# Patient Record
Sex: MALE | Race: BLACK OR AFRICAN AMERICAN | NOT HISPANIC OR LATINO | Employment: UNEMPLOYED | URBAN - METROPOLITAN AREA
[De-identification: names, ages, dates, MRNs, and addresses within clinical notes are randomized per-mention and may not be internally consistent; named-entity substitution may affect disease eponyms.]

---

## 2022-07-03 ENCOUNTER — HOSPITAL ENCOUNTER (INPATIENT)
Facility: HOSPITAL | Age: 47
LOS: 6 days | Discharge: HOME | DRG: 684 | End: 2022-07-11
Attending: EMERGENCY MEDICINE | Admitting: INTERNAL MEDICINE
Payer: COMMERCIAL

## 2022-07-03 DIAGNOSIS — N17.9 AKI (ACUTE KIDNEY INJURY): ICD-10-CM

## 2022-07-03 DIAGNOSIS — R17 SERUM TOTAL BILIRUBIN ELEVATED: ICD-10-CM

## 2022-07-03 DIAGNOSIS — R07.9 CHEST PAIN: ICD-10-CM

## 2022-07-03 DIAGNOSIS — E80.6 HYPERBILIRUBINEMIA: ICD-10-CM

## 2022-07-03 DIAGNOSIS — I10 HYPERTENSION, UNSPECIFIED TYPE: ICD-10-CM

## 2022-07-03 DIAGNOSIS — R79.89 ELEVATED LFTS: ICD-10-CM

## 2022-07-03 DIAGNOSIS — R55 SYNCOPE: ICD-10-CM

## 2022-07-03 DIAGNOSIS — E87.6 HYPOKALEMIA: ICD-10-CM

## 2022-07-03 DIAGNOSIS — N17.9 ACUTE KIDNEY INJURY: Primary | ICD-10-CM

## 2022-07-03 DIAGNOSIS — G40.909 SEIZURE DISORDER: ICD-10-CM

## 2022-07-03 DIAGNOSIS — R74.8 ELEVATED LIVER ENZYMES: ICD-10-CM

## 2022-07-03 DIAGNOSIS — R55 NEAR SYNCOPE: ICD-10-CM

## 2022-07-03 PROBLEM — K76.6 PORTAL HYPERTENSION: Status: ACTIVE | Noted: 2022-07-03

## 2022-07-03 LAB
ALBUMIN SERPL BCP-MCNC: 3.2 G/DL (ref 3.5–5.2)
ALP SERPL-CCNC: 190 U/L (ref 55–135)
ALT SERPL W/O P-5'-P-CCNC: 508 U/L (ref 10–44)
AMPHET+METHAMPHET UR QL: NEGATIVE
ANION GAP SERPL CALC-SCNC: 12 MMOL/L (ref 8–16)
AST SERPL-CCNC: 333 U/L (ref 10–40)
BARBITURATES UR QL SCN>200 NG/ML: NEGATIVE
BASOPHILS # BLD AUTO: 0.09 K/UL (ref 0–0.2)
BASOPHILS NFR BLD: 1 % (ref 0–1.9)
BENZODIAZ UR QL SCN>200 NG/ML: NEGATIVE
BILIRUB DIRECT SERPL-MCNC: 5.1 MG/DL (ref 0.1–0.3)
BILIRUB SERPL-MCNC: 6.9 MG/DL (ref 0.1–1)
BILIRUB UR QL STRIP: ABNORMAL
BNP SERPL-MCNC: 36 PG/ML (ref 0–99)
BUN SERPL-MCNC: 16 MG/DL (ref 6–20)
BZE UR QL SCN: NEGATIVE
CALCIUM SERPL-MCNC: 9.2 MG/DL (ref 8.7–10.5)
CANNABINOIDS UR QL SCN: NEGATIVE
CHLORIDE SERPL-SCNC: 105 MMOL/L (ref 95–110)
CK SERPL-CCNC: 324 U/L (ref 20–200)
CLARITY UR: CLEAR
CO2 SERPL-SCNC: 25 MMOL/L (ref 23–29)
COLOR UR: YELLOW
CREAT SERPL-MCNC: 2.1 MG/DL (ref 0.5–1.4)
CREAT UR-MCNC: 130.9 MG/DL (ref 23–375)
CREAT UR-MCNC: 149.2 MG/DL (ref 23–375)
DIFFERENTIAL METHOD: ABNORMAL
EOSINOPHIL # BLD AUTO: 0.2 K/UL (ref 0–0.5)
EOSINOPHIL NFR BLD: 1.9 % (ref 0–8)
ERYTHROCYTE [DISTWIDTH] IN BLOOD BY AUTOMATED COUNT: 21.7 % (ref 11.5–14.5)
EST. GFR  (AFRICAN AMERICAN): 43 ML/MIN/1.73 M^2
EST. GFR  (NON AFRICAN AMERICAN): 37 ML/MIN/1.73 M^2
GLUCOSE SERPL-MCNC: 104 MG/DL (ref 70–110)
GLUCOSE UR QL STRIP: NEGATIVE
HCT VFR BLD AUTO: 37.1 % (ref 40–54)
HGB BLD-MCNC: 13 G/DL (ref 14–18)
HGB UR QL STRIP: NEGATIVE
IMM GRANULOCYTES # BLD AUTO: 0.02 K/UL (ref 0–0.04)
IMM GRANULOCYTES NFR BLD AUTO: 0.2 % (ref 0–0.5)
KETONES UR QL STRIP: NEGATIVE
LEUKOCYTE ESTERASE UR QL STRIP: NEGATIVE
LYMPHOCYTES # BLD AUTO: 2.3 K/UL (ref 1–4.8)
LYMPHOCYTES NFR BLD: 26 % (ref 18–48)
MCH RBC QN AUTO: 28.6 PG (ref 27–31)
MCHC RBC AUTO-ENTMCNC: 35 G/DL (ref 32–36)
MCV RBC AUTO: 82 FL (ref 82–98)
METHADONE UR QL SCN>300 NG/ML: NEGATIVE
MONOCYTES # BLD AUTO: 1.3 K/UL (ref 0.3–1)
MONOCYTES NFR BLD: 15.2 % (ref 4–15)
NEUTROPHILS # BLD AUTO: 4.9 K/UL (ref 1.8–7.7)
NEUTROPHILS NFR BLD: 55.7 % (ref 38–73)
NITRITE UR QL STRIP: NEGATIVE
NRBC BLD-RTO: 0 /100 WBC
OPIATES UR QL SCN: NEGATIVE
PCP UR QL SCN>25 NG/ML: NEGATIVE
PH UR STRIP: 7 [PH] (ref 5–8)
PLATELET # BLD AUTO: 241 K/UL (ref 150–450)
PMV BLD AUTO: 10.6 FL (ref 9.2–12.9)
POTASSIUM SERPL-SCNC: 3 MMOL/L (ref 3.5–5.1)
PROT SERPL-MCNC: 7.8 G/DL (ref 6–8.4)
PROT UR QL STRIP: ABNORMAL
RBC # BLD AUTO: 4.55 M/UL (ref 4.6–6.2)
SARS-COV-2 RDRP RESP QL NAA+PROBE: NEGATIVE
SODIUM SERPL-SCNC: 142 MMOL/L (ref 136–145)
SODIUM UR-SCNC: 118 MMOL/L (ref 20–250)
SP GR UR STRIP: 1.01 (ref 1–1.03)
TOXICOLOGY INFORMATION: NORMAL
TROPONIN I SERPL DL<=0.01 NG/ML-MCNC: 0.03 NG/ML (ref 0–0.03)
TSH SERPL DL<=0.005 MIU/L-ACNC: 0.75 UIU/ML (ref 0.4–4)
URN SPEC COLLECT METH UR: ABNORMAL
UROBILINOGEN UR STRIP-ACNC: >=8 EU/DL
UUN UR-MCNC: 441 MG/DL (ref 140–1050)
WBC # BLD AUTO: 8.84 K/UL (ref 3.9–12.7)

## 2022-07-03 PROCEDURE — 82550 ASSAY OF CK (CPK): CPT | Performed by: STUDENT IN AN ORGANIZED HEALTH CARE EDUCATION/TRAINING PROGRAM

## 2022-07-03 PROCEDURE — G0378 HOSPITAL OBSERVATION PER HR: HCPCS

## 2022-07-03 PROCEDURE — 84540 ASSAY OF URINE/UREA-N: CPT | Performed by: STUDENT IN AN ORGANIZED HEALTH CARE EDUCATION/TRAINING PROGRAM

## 2022-07-03 PROCEDURE — 87389 HIV-1 AG W/HIV-1&-2 AB AG IA: CPT | Performed by: STUDENT IN AN ORGANIZED HEALTH CARE EDUCATION/TRAINING PROGRAM

## 2022-07-03 PROCEDURE — 81003 URINALYSIS AUTO W/O SCOPE: CPT | Mod: 59 | Performed by: EMERGENCY MEDICINE

## 2022-07-03 PROCEDURE — 93010 EKG 12-LEAD: ICD-10-PCS | Mod: ,,, | Performed by: INTERNAL MEDICINE

## 2022-07-03 PROCEDURE — 96361 HYDRATE IV INFUSION ADD-ON: CPT

## 2022-07-03 PROCEDURE — 84484 ASSAY OF TROPONIN QUANT: CPT | Performed by: EMERGENCY MEDICINE

## 2022-07-03 PROCEDURE — 93010 ELECTROCARDIOGRAM REPORT: CPT | Mod: ,,, | Performed by: INTERNAL MEDICINE

## 2022-07-03 PROCEDURE — 96360 HYDRATION IV INFUSION INIT: CPT

## 2022-07-03 PROCEDURE — 96372 THER/PROPH/DIAG INJ SC/IM: CPT | Performed by: STUDENT IN AN ORGANIZED HEALTH CARE EDUCATION/TRAINING PROGRAM

## 2022-07-03 PROCEDURE — 85025 COMPLETE CBC W/AUTO DIFF WBC: CPT | Performed by: EMERGENCY MEDICINE

## 2022-07-03 PROCEDURE — 80074 ACUTE HEPATITIS PANEL: CPT | Performed by: STUDENT IN AN ORGANIZED HEALTH CARE EDUCATION/TRAINING PROGRAM

## 2022-07-03 PROCEDURE — 83880 ASSAY OF NATRIURETIC PEPTIDE: CPT | Performed by: EMERGENCY MEDICINE

## 2022-07-03 PROCEDURE — 84300 ASSAY OF URINE SODIUM: CPT | Performed by: STUDENT IN AN ORGANIZED HEALTH CARE EDUCATION/TRAINING PROGRAM

## 2022-07-03 PROCEDURE — 63600175 PHARM REV CODE 636 W HCPCS: Performed by: STUDENT IN AN ORGANIZED HEALTH CARE EDUCATION/TRAINING PROGRAM

## 2022-07-03 PROCEDURE — 93005 ELECTROCARDIOGRAM TRACING: CPT

## 2022-07-03 PROCEDURE — 80053 COMPREHEN METABOLIC PANEL: CPT | Performed by: EMERGENCY MEDICINE

## 2022-07-03 PROCEDURE — 25000003 PHARM REV CODE 250: Performed by: STUDENT IN AN ORGANIZED HEALTH CARE EDUCATION/TRAINING PROGRAM

## 2022-07-03 PROCEDURE — 25000003 PHARM REV CODE 250: Performed by: EMERGENCY MEDICINE

## 2022-07-03 PROCEDURE — U0002 COVID-19 LAB TEST NON-CDC: HCPCS | Performed by: EMERGENCY MEDICINE

## 2022-07-03 PROCEDURE — 84443 ASSAY THYROID STIM HORMONE: CPT | Performed by: STUDENT IN AN ORGANIZED HEALTH CARE EDUCATION/TRAINING PROGRAM

## 2022-07-03 PROCEDURE — 80307 DRUG TEST PRSMV CHEM ANLYZR: CPT | Performed by: EMERGENCY MEDICINE

## 2022-07-03 PROCEDURE — 82248 BILIRUBIN DIRECT: CPT | Performed by: STUDENT IN AN ORGANIZED HEALTH CARE EDUCATION/TRAINING PROGRAM

## 2022-07-03 PROCEDURE — 99285 EMERGENCY DEPT VISIT HI MDM: CPT | Mod: 25

## 2022-07-03 PROCEDURE — 82570 ASSAY OF URINE CREATININE: CPT | Performed by: STUDENT IN AN ORGANIZED HEALTH CARE EDUCATION/TRAINING PROGRAM

## 2022-07-03 RX ORDER — SODIUM CHLORIDE 0.9 % (FLUSH) 0.9 %
10 SYRINGE (ML) INJECTION EVERY 12 HOURS PRN
Status: DISCONTINUED | OUTPATIENT
Start: 2022-07-03 | End: 2022-07-11 | Stop reason: HOSPADM

## 2022-07-03 RX ORDER — NALOXONE HCL 0.4 MG/ML
0.02 VIAL (ML) INJECTION
Status: DISCONTINUED | OUTPATIENT
Start: 2022-07-03 | End: 2022-07-11 | Stop reason: HOSPADM

## 2022-07-03 RX ORDER — HYDRALAZINE HYDROCHLORIDE 100 MG/1
100 TABLET, FILM COATED ORAL 3 TIMES DAILY
Status: ON HOLD | COMMUNITY
Start: 2022-03-18 | End: 2022-07-11 | Stop reason: HOSPADM

## 2022-07-03 RX ORDER — SILDENAFIL CITRATE 20 MG/1
TABLET ORAL
COMMUNITY
Start: 2022-04-20 | End: 2022-07-03

## 2022-07-03 RX ORDER — ATORVASTATIN CALCIUM 40 MG/1
40 TABLET, FILM COATED ORAL DAILY
Status: DISCONTINUED | OUTPATIENT
Start: 2022-07-04 | End: 2022-07-04

## 2022-07-03 RX ORDER — CHLORTHALIDONE 25 MG/1
25 TABLET ORAL EVERY MORNING
Status: ON HOLD | COMMUNITY
Start: 2022-03-18 | End: 2022-07-11 | Stop reason: HOSPADM

## 2022-07-03 RX ORDER — ATORVASTATIN CALCIUM 40 MG/1
40 TABLET, FILM COATED ORAL DAILY
COMMUNITY
Start: 2022-03-18

## 2022-07-03 RX ORDER — LAMOTRIGINE 25 MG/1
50 TABLET ORAL 2 TIMES DAILY
Status: ON HOLD | COMMUNITY
Start: 2022-04-16 | End: 2022-07-11 | Stop reason: HOSPADM

## 2022-07-03 RX ORDER — AMLODIPINE BESYLATE 5 MG/1
10 TABLET ORAL DAILY
Status: DISCONTINUED | OUTPATIENT
Start: 2022-07-04 | End: 2022-07-11 | Stop reason: HOSPADM

## 2022-07-03 RX ORDER — AMLODIPINE BESYLATE 10 MG/1
10 TABLET ORAL DAILY
COMMUNITY
Start: 2022-03-08

## 2022-07-03 RX ORDER — SODIUM CHLORIDE 9 MG/ML
1000 INJECTION, SOLUTION INTRAVENOUS
Status: COMPLETED | OUTPATIENT
Start: 2022-07-03 | End: 2022-07-03

## 2022-07-03 RX ORDER — LAMOTRIGINE 25 MG/1
50 TABLET ORAL 2 TIMES DAILY
Status: DISCONTINUED | OUTPATIENT
Start: 2022-07-03 | End: 2022-07-06

## 2022-07-03 RX ORDER — POTASSIUM CHLORIDE 750 MG/1
10 TABLET, EXTENDED RELEASE ORAL
Status: COMPLETED | OUTPATIENT
Start: 2022-07-03 | End: 2022-07-04

## 2022-07-03 RX ORDER — CARVEDILOL 12.5 MG/1
12.5 TABLET ORAL 2 TIMES DAILY
Status: ON HOLD | COMMUNITY
End: 2022-07-11 | Stop reason: HOSPADM

## 2022-07-03 RX ORDER — HEPARIN SODIUM 5000 [USP'U]/ML
5000 INJECTION, SOLUTION INTRAVENOUS; SUBCUTANEOUS EVERY 8 HOURS
Status: DISCONTINUED | OUTPATIENT
Start: 2022-07-03 | End: 2022-07-11 | Stop reason: HOSPADM

## 2022-07-03 RX ADMIN — HEPARIN SODIUM 5000 UNITS: 5000 INJECTION INTRAVENOUS; SUBCUTANEOUS at 10:07

## 2022-07-03 RX ADMIN — POTASSIUM CHLORIDE 10 MEQ: 10 TABLET, EXTENDED RELEASE ORAL at 10:07

## 2022-07-03 RX ADMIN — SODIUM CHLORIDE 1000 ML: 0.9 INJECTION, SOLUTION INTRAVENOUS at 05:07

## 2022-07-03 RX ADMIN — POTASSIUM CHLORIDE 10 MEQ: 10 TABLET, EXTENDED RELEASE ORAL at 09:07

## 2022-07-03 RX ADMIN — SODIUM CHLORIDE, SODIUM LACTATE, POTASSIUM CHLORIDE, AND CALCIUM CHLORIDE 2000 ML: .6; .31; .03; .02 INJECTION, SOLUTION INTRAVENOUS at 10:07

## 2022-07-03 RX ADMIN — POTASSIUM CHLORIDE 10 MEQ: 10 TABLET, EXTENDED RELEASE ORAL at 11:07

## 2022-07-03 NOTE — ED PROVIDER NOTES
Encounter Date: 7/3/2022       History     Chief Complaint   Patient presents with    near syncopal episode     Patient was at work and admits that he may have taken too much of his BP medication. Patient became dizzy and near fainted. BP was 90's palpable and improved after fluid bolus. EMS started iv access and gave fluid bolus of 150. Denies pain at present time. Patient works at [employer]     Patient is a 47 yo AAM with a pmhx of HTN who presents to the ED via EMS with the complaint of near syncope. [place] airport [place]. Pt states that he is prescribed chlorthalidone 25 mg daily, amlodipine 10mg daily, carvedilol 12.5mg BID, hydralazine 100mg (0.5 tablets) TID. Pt states that he took 2 of his hydralazine today. He denies any preceding chest pain or shortness of breath. Pt reports feeling initially light headed and dizzy, but he denies any complaints at this time. Per EMS, the patient's BP was 90/palp and improved with a small NS bolus. Additionally, the pt does have a seizure hx for which he takes Lamictal but he denies any seizure type activity when questioned (he denies any oral trauma and/or urinary incontinence).         Review of patient's allergies indicates:  No Known Allergies  No past medical history on file.  No past surgical history on file.  No family history on file.     Review of Systems   Constitutional: Negative for chills and fever.   HENT: Negative for congestion, trouble swallowing and voice change.    Respiratory: Negative for cough, chest tightness and shortness of breath.    Gastrointestinal: Negative for abdominal pain, diarrhea, nausea and vomiting.   Genitourinary: Negative for dysuria and frequency.   Musculoskeletal: Negative for back pain, joint swelling and myalgias.   Skin: Negative for pallor and rash.   Neurological: Positive for syncope (near syncope) and light-headedness. Negative for dizziness, seizures, speech difficulty and  headaches.   Psychiatric/Behavioral: Negative for agitation and confusion.       Physical Exam     Initial Vitals [07/03/22 1443]   BP Pulse Resp Temp SpO2   137/87 62 20 98.2 °F (36.8 °C) 100 %      MAP       --         Physical Exam    Nursing note and vitals reviewed.  Constitutional: He appears well-developed and well-nourished.   Well-appearing  No acute distress noted    HENT:   Head: Normocephalic and atraumatic.   Right Ear: External ear normal.   Left Ear: External ear normal.   Oropharynx clear - no signs of trauma    Eyes: Conjunctivae and EOM are normal. Pupils are equal, round, and reactive to light.   Neck: Neck supple.   Normal range of motion.  Cardiovascular: Normal rate, regular rhythm, normal heart sounds and intact distal pulses.   Pulmonary/Chest: Breath sounds normal.   Abdominal: Abdomen is soft. Bowel sounds are normal.   Musculoskeletal:         General: Normal range of motion.      Cervical back: Normal range of motion and neck supple.     Neurological: He is alert and oriented to person, place, and time. He has normal strength. GCS score is 15. GCS eye subscore is 4. GCS verbal subscore is 5. GCS motor subscore is 6.   Skin: Skin is warm. Capillary refill takes less than 2 seconds.   Psychiatric: He has a normal mood and affect.         ED Course   Procedures  Labs Reviewed   CBC W/ AUTO DIFFERENTIAL - Abnormal; Notable for the following components:       Result Value    RBC 4.55 (*)     Hemoglobin 13.0 (*)     Hematocrit 37.1 (*)     RDW 21.7 (*)     Mono # 1.3 (*)     Mono % 15.2 (*)     All other components within normal limits   COMPREHENSIVE METABOLIC PANEL - Abnormal; Notable for the following components:    Potassium 3.0 (*)     Creatinine 2.1 (*)     Albumin 3.2 (*)     Total Bilirubin 6.9 (*)     Alkaline Phosphatase 190 (*)      (*)      (*)     eGFR if  43 (*)     eGFR if non  37 (*)     All other components within normal limits    TROPONIN I - Abnormal; Notable for the following components:    Troponin I 0.028 (*)     All other components within normal limits   URINALYSIS, REFLEX TO URINE CULTURE - Abnormal; Notable for the following components:    Protein, UA Trace (*)     Bilirubin (UA) 1+ (*)     Urobilinogen, UA >=8.0 (*)     All other components within normal limits    Narrative:     Specimen Source->Urine   B-TYPE NATRIURETIC PEPTIDE   DRUG SCREEN PANEL, URINE EMERGENCY    Narrative:     Specimen Source->Urine   SARS-COV-2 RNA AMPLIFICATION, QUAL   CK     EKG Readings: (Independently Interpreted)   NSR; non specific T wave abnormality; no STEMI        Imaging Results          X-Ray Chest AP Portable (Final result)  Result time 07/03/22 16:23:03    Final result by Gentry Grant MD (07/03/22 16:23:03)                 Impression:      No radiographic acute intrathoracic process seen on this single view.      Electronically signed by: Gentry Grant MD  Date:    07/03/2022  Time:    16:23             Narrative:    EXAMINATION:  XR CHEST AP PORTABLE    CLINICAL HISTORY:  Syncope and collapse    TECHNIQUE:  Single frontal view of the chest was performed.    COMPARISON:  None    FINDINGS:  Monitoring leads overlie the chest.The lungs are well expanded and clear, with normal appearance of pulmonary vasculature and no pleural effusion or pneumothorax.    The cardiac silhouette is normal in size. The hilar and mediastinal contours are unremarkable.    No acute osseous process seen.  PA and lateral views can be obtained.                                 Medications   0.9%  NaCl infusion (1,000 mLs Intravenous New Bag 7/3/22 1700)     Medical Decision Making:   Clinical Tests:   Lab Tests: Ordered and Reviewed  ED Management:  - pt with Cr of 2.1 following small fluid bolus; no baseline to which to compare; pt with elevated LFTs, Tbili - he denies any hx of liver dz, RUQ pain  - will admit for JOÃO, elevated LFTs  - pt in agreement with plan for  admission                         Clinical Impression:   Final diagnoses:  [R55] Syncope  [R55] Near syncope  [N17.9] Acute kidney injury (Primary)  [R79.89] Elevated LFTs  [R17] Serum total bilirubin elevated          ED Disposition Condition    Observation               Dominic Branham MD  07/03/22 5882

## 2022-07-03 NOTE — PHARMACY MED REC
"    [facility]           Pharmacy  Admission Medication History     The home medication history was taken by Mary Anne Ribeiro.      Medication history obtained from Medications listed below were obtained from: Patient/family    Based on information gathered for medication list, you may go to "Admission" then "Reconcile Home Medications" tabs to review and/or act upon those items.      The home medication list has been updated by the Pharmacy department.    Please read ALL comments highlighted in yellow.    Please address this information as you see fit.     Feel free to contact us if you have any questions or require assistance.      No current facility-administered medications on file prior to encounter.     Current Outpatient Medications on File Prior to Encounter   Medication Sig Dispense Refill    amLODIPine (NORVASC) 10 MG tablet Take 10 mg by mouth once daily.      atorvastatin (LIPITOR) 40 MG tablet Take 40 mg by mouth once daily.      carvediloL (COREG) 12.5 MG tablet Take 12.5 mg by mouth 2 (two) times daily.      chlorthalidone (HYGROTEN) 25 MG Tab Take 25 mg by mouth every morning.      hydrALAZINE (APRESOLINE) 100 MG tablet Take 100 mg by mouth 3 (three) times daily.      lamoTRIgine (LAMICTAL) 25 MG tablet Take 50 mg by mouth 2 (two) times daily.      sildenafil (REVATIO) 20 mg Tab SMARTSIG:3 Tablet(s) By Mouth PRN         Please address this information as you see fit.  Feel free to contact us if you have any questions or require assistance.    Mary Anne Ribeiro  [phone]              .          "

## 2022-07-04 LAB
ALBUMIN SERPL BCP-MCNC: 2.8 G/DL (ref 3.5–5.2)
ALP SERPL-CCNC: 183 U/L (ref 55–135)
ALT SERPL W/O P-5'-P-CCNC: 411 U/L (ref 10–44)
ANION GAP SERPL CALC-SCNC: 12 MMOL/L (ref 8–16)
APAP SERPL-MCNC: <3 UG/ML (ref 10–20)
AST SERPL-CCNC: 261 U/L (ref 10–40)
BASOPHILS # BLD AUTO: 0.09 K/UL (ref 0–0.2)
BASOPHILS NFR BLD: 1 % (ref 0–1.9)
BILIRUB SERPL-MCNC: 7 MG/DL (ref 0.1–1)
BUN SERPL-MCNC: 15 MG/DL (ref 6–20)
CALCIUM SERPL-MCNC: 9 MG/DL (ref 8.7–10.5)
CHLORIDE SERPL-SCNC: 106 MMOL/L (ref 95–110)
CHOLEST SERPL-MCNC: 213 MG/DL (ref 120–199)
CHOLEST/HDLC SERPL: 17.8 {RATIO} (ref 2–5)
CO2 SERPL-SCNC: 26 MMOL/L (ref 23–29)
CREAT SERPL-MCNC: 1.7 MG/DL (ref 0.5–1.4)
CRP SERPL-MCNC: 12.8 MG/L (ref 0–8.2)
DIFFERENTIAL METHOD: ABNORMAL
EOSINOPHIL # BLD AUTO: 0.2 K/UL (ref 0–0.5)
EOSINOPHIL NFR BLD: 2.5 % (ref 0–8)
ERYTHROCYTE [DISTWIDTH] IN BLOOD BY AUTOMATED COUNT: 21.6 % (ref 11.5–14.5)
ERYTHROCYTE [SEDIMENTATION RATE] IN BLOOD BY WESTERGREN METHOD: 13 MM/HR (ref 0–10)
EST. GFR  (AFRICAN AMERICAN): 55 ML/MIN/1.73 M^2
EST. GFR  (NON AFRICAN AMERICAN): 48 ML/MIN/1.73 M^2
GLUCOSE SERPL-MCNC: 104 MG/DL (ref 70–110)
HAV IGM SERPL QL IA: NEGATIVE
HBV CORE IGM SERPL QL IA: NEGATIVE
HBV SURFACE AG SERPL QL IA: NEGATIVE
HCT VFR BLD AUTO: 35.6 % (ref 40–54)
HCV AB SERPL QL IA: NEGATIVE
HDLC SERPL-MCNC: 12 MG/DL (ref 40–75)
HDLC SERPL: 5.6 % (ref 20–50)
HGB BLD-MCNC: 12.4 G/DL (ref 14–18)
HIV 1+2 AB+HIV1 P24 AG SERPL QL IA: NEGATIVE
IMM GRANULOCYTES # BLD AUTO: 0.02 K/UL (ref 0–0.04)
IMM GRANULOCYTES NFR BLD AUTO: 0.2 % (ref 0–0.5)
LDLC SERPL CALC-MCNC: 175 MG/DL (ref 63–159)
LYMPHOCYTES # BLD AUTO: 2.9 K/UL (ref 1–4.8)
LYMPHOCYTES NFR BLD: 32 % (ref 18–48)
MAGNESIUM SERPL-MCNC: 1.7 MG/DL (ref 1.6–2.6)
MCH RBC QN AUTO: 28.9 PG (ref 27–31)
MCHC RBC AUTO-ENTMCNC: 34.8 G/DL (ref 32–36)
MCV RBC AUTO: 83 FL (ref 82–98)
MONOCYTES # BLD AUTO: 1.2 K/UL (ref 0.3–1)
MONOCYTES NFR BLD: 13.4 % (ref 4–15)
NEUTROPHILS # BLD AUTO: 4.7 K/UL (ref 1.8–7.7)
NEUTROPHILS NFR BLD: 50.9 % (ref 38–73)
NONHDLC SERPL-MCNC: 201 MG/DL
NRBC BLD-RTO: 0 /100 WBC
PHOSPHATE SERPL-MCNC: 3.4 MG/DL (ref 2.7–4.5)
PLATELET # BLD AUTO: 235 K/UL (ref 150–450)
PMV BLD AUTO: 10.9 FL (ref 9.2–12.9)
POTASSIUM SERPL-SCNC: 3 MMOL/L (ref 3.5–5.1)
PROT SERPL-MCNC: 7 G/DL (ref 6–8.4)
RBC # BLD AUTO: 4.29 M/UL (ref 4.6–6.2)
SALICYLATES SERPL-MCNC: <5 MG/DL (ref 15–30)
SODIUM SERPL-SCNC: 144 MMOL/L (ref 136–145)
TRIGL SERPL-MCNC: 130 MG/DL (ref 30–150)
TROPONIN I SERPL DL<=0.01 NG/ML-MCNC: 0.01 NG/ML (ref 0–0.03)
TROPONIN I SERPL DL<=0.01 NG/ML-MCNC: 0.02 NG/ML (ref 0–0.03)
WBC # BLD AUTO: 9.13 K/UL (ref 3.9–12.7)

## 2022-07-04 PROCEDURE — 63600175 PHARM REV CODE 636 W HCPCS: Performed by: STUDENT IN AN ORGANIZED HEALTH CARE EDUCATION/TRAINING PROGRAM

## 2022-07-04 PROCEDURE — 99204 OFFICE O/P NEW MOD 45 MIN: CPT | Mod: ,,, | Performed by: INTERNAL MEDICINE

## 2022-07-04 PROCEDURE — 85025 COMPLETE CBC W/AUTO DIFF WBC: CPT | Performed by: STUDENT IN AN ORGANIZED HEALTH CARE EDUCATION/TRAINING PROGRAM

## 2022-07-04 PROCEDURE — 99204 PR OFFICE/OUTPT VISIT, NEW, LEVL IV, 45-59 MIN: ICD-10-PCS | Mod: ,,, | Performed by: INTERNAL MEDICINE

## 2022-07-04 PROCEDURE — G0378 HOSPITAL OBSERVATION PER HR: HCPCS

## 2022-07-04 PROCEDURE — 86140 C-REACTIVE PROTEIN: CPT | Performed by: STUDENT IN AN ORGANIZED HEALTH CARE EDUCATION/TRAINING PROGRAM

## 2022-07-04 PROCEDURE — 96372 THER/PROPH/DIAG INJ SC/IM: CPT | Performed by: STUDENT IN AN ORGANIZED HEALTH CARE EDUCATION/TRAINING PROGRAM

## 2022-07-04 PROCEDURE — 80179 DRUG ASSAY SALICYLATE: CPT | Performed by: STUDENT IN AN ORGANIZED HEALTH CARE EDUCATION/TRAINING PROGRAM

## 2022-07-04 PROCEDURE — 80053 COMPREHEN METABOLIC PANEL: CPT | Performed by: STUDENT IN AN ORGANIZED HEALTH CARE EDUCATION/TRAINING PROGRAM

## 2022-07-04 PROCEDURE — 25000003 PHARM REV CODE 250: Performed by: STUDENT IN AN ORGANIZED HEALTH CARE EDUCATION/TRAINING PROGRAM

## 2022-07-04 PROCEDURE — 36415 COLL VENOUS BLD VENIPUNCTURE: CPT | Performed by: STUDENT IN AN ORGANIZED HEALTH CARE EDUCATION/TRAINING PROGRAM

## 2022-07-04 PROCEDURE — 83735 ASSAY OF MAGNESIUM: CPT | Performed by: STUDENT IN AN ORGANIZED HEALTH CARE EDUCATION/TRAINING PROGRAM

## 2022-07-04 PROCEDURE — 80061 LIPID PANEL: CPT | Performed by: STUDENT IN AN ORGANIZED HEALTH CARE EDUCATION/TRAINING PROGRAM

## 2022-07-04 PROCEDURE — 85652 RBC SED RATE AUTOMATED: CPT | Performed by: STUDENT IN AN ORGANIZED HEALTH CARE EDUCATION/TRAINING PROGRAM

## 2022-07-04 PROCEDURE — 84100 ASSAY OF PHOSPHORUS: CPT | Performed by: STUDENT IN AN ORGANIZED HEALTH CARE EDUCATION/TRAINING PROGRAM

## 2022-07-04 PROCEDURE — 80143 DRUG ASSAY ACETAMINOPHEN: CPT | Performed by: STUDENT IN AN ORGANIZED HEALTH CARE EDUCATION/TRAINING PROGRAM

## 2022-07-04 PROCEDURE — 84484 ASSAY OF TROPONIN QUANT: CPT | Performed by: STUDENT IN AN ORGANIZED HEALTH CARE EDUCATION/TRAINING PROGRAM

## 2022-07-04 RX ORDER — MAGNESIUM SULFATE HEPTAHYDRATE 40 MG/ML
2 INJECTION, SOLUTION INTRAVENOUS ONCE
Status: COMPLETED | OUTPATIENT
Start: 2022-07-04 | End: 2022-07-04

## 2022-07-04 RX ADMIN — POTASSIUM BICARBONATE 10 MEQ: 391 TABLET, EFFERVESCENT ORAL at 12:07

## 2022-07-04 RX ADMIN — POTASSIUM BICARBONATE 10 MEQ: 391 TABLET, EFFERVESCENT ORAL at 10:07

## 2022-07-04 RX ADMIN — HEPARIN SODIUM 5000 UNITS: 5000 INJECTION INTRAVENOUS; SUBCUTANEOUS at 08:07

## 2022-07-04 RX ADMIN — AMLODIPINE BESYLATE 10 MG: 5 TABLET ORAL at 08:07

## 2022-07-04 RX ADMIN — LAMOTRIGINE 50 MG: 25 TABLET ORAL at 08:07

## 2022-07-04 RX ADMIN — LAMOTRIGINE 50 MG: 25 TABLET ORAL at 09:07

## 2022-07-04 RX ADMIN — POTASSIUM BICARBONATE 10 MEQ: 391 TABLET, EFFERVESCENT ORAL at 01:07

## 2022-07-04 RX ADMIN — MAGNESIUM SULFATE HEPTAHYDRATE 2 G: 40 INJECTION, SOLUTION INTRAVENOUS at 12:07

## 2022-07-04 RX ADMIN — SODIUM CHLORIDE, SODIUM LACTATE, POTASSIUM CHLORIDE, AND CALCIUM CHLORIDE 1000 ML: .6; .31; .03; .02 INJECTION, SOLUTION INTRAVENOUS at 10:07

## 2022-07-04 RX ADMIN — HEPARIN SODIUM 5000 UNITS: 5000 INJECTION INTRAVENOUS; SUBCUTANEOUS at 09:07

## 2022-07-04 RX ADMIN — HEPARIN SODIUM 5000 UNITS: 5000 INJECTION INTRAVENOUS; SUBCUTANEOUS at 01:07

## 2022-07-04 RX ADMIN — POTASSIUM CHLORIDE 10 MEQ: 10 TABLET, EXTENDED RELEASE ORAL at 12:07

## 2022-07-04 NOTE — PROGRESS NOTES
[facility] Resident Progress Note    Admitting Team: [facility] Hospitalist Team A   Attending Physician: Greg Pack MD   Resident:  Ebony  Intern: Eugenio    Date of Admit: 7/3/2022    Subjective:      NAEON    This AM, patient is resting comfortably. No complaints at this time. Denies any dizziness, sweating, pain, f/c/n/v.      Objective:   Last 24 Hour Vital Signs:  BP  Min: 122/59  Max: 156/90  Temp  Av.2 °F (36.8 °C)  Min: 98.2 °F (36.8 °C)  Max: 98.2 °F (36.8 °C)  Pulse  Av.7  Min: 62  Max: 74  Resp  Av  Min: 11  Max: 20  SpO2  Av.2 %  Min: 94 %  Max: 100 %  Height  Av' (182.9 cm)  Min: 6' (182.9 cm)  Max: 6' (182.9 cm)  Weight  Av kg (279 lb 15.8 oz)  Min: 127 kg (279 lb 15.8 oz)  Max: 127 kg (279 lb 15.8 oz)  No intake/output data recorded.    Physical Examination:    Physical Exam     General: A&Ox3, resting comfortably in bed  HEENT: PERRL, EOMI, moist mucus membranes w/ no erythema noted, no facial asymmetry noted  Neck: Trachea midline, no masses, no lymphadenopathy, no JVD  Cardiovascular: Regular rate and rhythm, normal S1 and S2, no murmurs, rubs or gallops  Pulm: CTAB, no wheezes or crackles; no respiratory distress  Abdomen: Soft, non-tender, normal BS, non-distended; no organomegaly  Skin: No rashes or erythema noted, no ecchymosis  Extremities: atraumatic, no cyanosis or edema  Pulses: 2+ and symmetric  Neurological: A&Ox3, no focal deficits  Psychiatric: normal mood and affect, thought content normal    Laboratory:  Trended Lab Data:  Recent Labs   Lab 22  1625 22  0502   WBC 8.84 9.13   HGB 13.0* 12.4*   HCT 37.1* 35.6*    235   MCV 82 83   RDW 21.7* 21.6*    144   K 3.0* 3.0*    106   CO2 25 26   BUN 16 15   CREATININE 2.1* 1.7*    104   PROT 7.8 7.0   ALBUMIN 3.2* 2.8*   BILITOT 6.9* 7.0*   * 261*   ALKPHOS 190* 183*   * 411*       Trended Cardiac Data:  Recent Labs   Lab 22  1625   TROPONINI 0.028*   BNP 36        Microbiology:  Microbiology Results (last 7 days)     ** No results found for the last 168 hours. **           Other Results:  EKG (my interpretation):     No results found for this or any previous visit.     Radiology:  Imaging Results          US Abdomen Complete (Final result)  Result time 07/03/22 22:20:00    Final result by Kit Johnson DO (07/03/22 22:20:00)                 Impression:      Nonspecific gallbladder wall thickening which may be reactive or may be accentuated by gallbladder contraction.  No cholelithiasis or sonographic evidence of acute cholecystitis.    Hepatomegaly.      Electronically signed by: Kit Johnson  Date:    07/03/2022  Time:    22:20             Narrative:    EXAMINATION:  US ABDOMEN COMPLETE    CLINICAL HISTORY:  hyperbilirubinemia;    TECHNIQUE:  Complete abdominal ultrasound (including pancreas, aorta, liver, gallbladder, common bile duct, IVC, kidneys, and spleen) was performed.    COMPARISON:  None    FINDINGS:  Pancreas: The visualized portions of pancreas appear normal.    Aorta: No aneurysm.    Liver: 17.8 cm, enlarged.  Homogeneous parenchymal echotexture. No focal lesions.    Gallbladder: There is gallbladder wall thickening, measuring up to 6 mm.  The gallbladder is contracted.  There is gallbladder wall hyperemia.  No calculi or pericholecystic fluid.  Negative sonographic Calderón's sign.    Biliary system: 6 mm common bile duct.  No intrahepatic ductal dilatation.    Inferior vena cava: Normal in appearance.    Right kidney: 9.9 cm. No hydronephrosis.    Left kidney: 10.0 cm. No hydronephrosis.    Spleen: 10.3 x 3.6 cm.  Normal in size with homogeneous echotexture.    Miscellaneous: No ascites.                               X-Ray Chest AP Portable (Final result)  Result time 07/03/22 16:23:03    Final result by Gentry Grant MD (07/03/22 16:23:03)                 Impression:      No radiographic acute intrathoracic process seen on this single  view.      Electronically signed by: Gentry Grant MD  Date:    07/03/2022  Time:    16:23             Narrative:    EXAMINATION:  XR CHEST AP PORTABLE    CLINICAL HISTORY:  Syncope and collapse    TECHNIQUE:  Single frontal view of the chest was performed.    COMPARISON:  None    FINDINGS:  Monitoring leads overlie the chest.The lungs are well expanded and clear, with normal appearance of pulmonary vasculature and no pleural effusion or pneumothorax.    The cardiac silhouette is normal in size. The hilar and mediastinal contours are unremarkable.    No acute osseous process seen.  PA and lateral views can be obtained.                                 Current Medications:     Infusions:      Scheduled:   amLODIPine  10 mg Oral Daily    heparin (porcine)  5,000 Units Subcutaneous Q8H    lamoTRIgine  50 mg Oral BID        PRN:  naloxone, sodium chloride 0.9%    Antibiotics (From admission, onward)            None             Assessment:     Niecy Bolivar is a 46 y.o.male with  Patient Active Problem List    Diagnosis Date Noted    JOÃO (acute kidney injury) 07/03/2022    Elevated liver enzymes 07/03/2022    Hypokalemia 07/03/2022    Seizure disorder 07/03/2022    Elevated troponin 07/03/2022    Hyperbilirubinemia 07/03/2022    Hypertension 07/03/2022      Pt is a 46yoM who presents to [facility] for dizziness w/ near syncope for one day. Found to have been taking double his hydralazine dose for one month. Also found to have JOÃO with Cr of 2.1, transaminitis with  and , and hyperbilirubinemia with Tbilli 6.9. No history of renal disease or hepatic disease. No history of EtOH use. IV fluids given by ED with transfer to [facility] for further work up and management of JOÃO, hyperbilirubinemia, elevated liver function labs. Renal function improved this AM with Cr 1.7. Direct bilirubin found to be elevated at 5.1. Hepatitis Panels still in progress.       Plan:        JOÃO   -Cr on admit 2.1, baseline  unknown  -likely 2/2 to low volume status due to accidental over use of home antihypertensives compounded by sweating outside in heat  -Cr improved to 1.7 overnight with IV fluids  -Continue to replete  -Avoid nephrotoxic drugs, contrast        Near Syncope/ Dizziness  -Pt hypotensive per EMS to 90's upon their arrival  -Improved with fluid bolus  -No complaints of dizziness/fainting at this time  -Likely 2/2 to taking double hydralazine dose and compounded by hot weather  -CTM      Hyperbilirubinemia  -Tbilli 6.9 on admit  -Direct Bilirubin elevated at 5.1  -Abdominal US with gall bladder wall thickening, enlarged liver  -Daily CMP     Elevated Liver Enzymes  -, , Alk phos 190 on admit  -Hepatitis Panel in progress  -Improved to AST 26, , Alk phos 183     Hypokalemia  -K 3 on admit  -Oral K tablet 10meq administered   -No change in K overnight, remains at 3 this morning  -Replete with oral K 40 me     Essential HTN  -patient on chlorthalidone 25 mg daily, amlodipine 10mg daily, carvedilol 12.5mg BID, hydralazine 100mg (0.5 tablets) TID  - Patient reports doubling hydralazine dose for last month  - Amlodipine resumed and will titrate PRN  -Pressure elevated overnight and this AM at 140-156/69-80  -Resume chlorthalidone     Seizure Disorder  -Seizure free for years  -Continue home Lamictal 50mg    Diet: Reg  Code: Full  PPx: Hep  Dispo: Hyperbilirubinemia work up and continued resolution of JOÃO    Gigi Becker MD  [facility] Neurology HO-1  [facility] Internal Medicine Service   [facility] Hospitalist Team     [facility] Medicine Hospitalist Pager numbers:   [facility] Hospitalist Medicine Team A (Sirena/Luara): [phone]  [facility] Hospitalist Medicine Team B (Rober/Preston):

## 2022-07-04 NOTE — PLAN OF CARE
VN cued into room to complete admit assessment. VIP model introduced; VN working alongside bedside treatment team.  Plan of care reviewed with patient. Patient informed of fall risk, fall precautions, call light within reach, side rails x2 elevated. Patient notified to ask staff for assistance. Patient verbalized complete understanding. Time allowed for questions. Will continue to monitor and intervene as needed.

## 2022-07-04 NOTE — H&P
[facility] Hospital Medicine H&P Note     Admitting Team: [facility] Hospitalist Team A  Attending Physician: Sirena   Resident:  Ebony  Intern: Eugenio      Date of Admit: 7/3/2022    Chief Complaint     Dizziness and near syncope for one day    Subjective:      History of Present Illness:  Niecy Bolivar is a 46 y.o. male with a pmhx of HTN. [place] [facility] [place] with a primary complaint of dizziness with a near syncopal episode. Patient states he was in his normal state of health at work (patient works outside in heat at the  [employer] and became dizzy, sweaty, and near fainted. EMS reports the patients BP was 90's (palpable) but improved after fluid bolus. Patient denies any chest pain, palpitations, shortness of breath, cough, f/c/n/v.    Of note, patient states that he takes four different medications for his high blood pressure (chlorthalidone 25 mg daily, amlodipine 10mg daily, carvedilol 12.5mg BID, hydralazine 100mg (0.5 tablets) TID). He states that due to work hours, he has been taking two of his hydralazine tablets when he awakens at once instead of one so that he doesn't have to miss a dose at work. He states he has been doing this for about one month since he has been working in [place] (patient lives in [place].)     In the ED, the patient was found to have labs significant for elevated Cr (2.1 with unknown baseline), elevated transaminases (, ), and elevated Tbilli (6.9). He denies any history of renal disease, diabetes, or liver disease. He also denies heavy alcohol consumption or illicit drug use. He was given IV fluids in the ED and [facility] was consulted for management of new JOÃO, elevated transaminases, and consolidation of hypertensive medications.       Past Medical History:  No past medical history on file.    Past Surgical History:  No past surgical history on file.    Allergies:  Review of patient's allergies indicates:  No Known Allergies    Home  Medications:  Prior to Admission medications    Medication Sig Start Date End Date Taking? Authorizing Provider   amLODIPine (NORVASC) 10 MG tablet Take 10 mg by mouth once daily. 3/8/22  Yes Historical Provider   atorvastatin (LIPITOR) 40 MG tablet Take 40 mg by mouth once daily. 3/18/22  Yes Historical Provider   carvediloL (COREG) 12.5 MG tablet Take 12.5 mg by mouth 2 (two) times daily.   Yes Historical Provider   chlorthalidone (HYGROTEN) 25 MG Tab Take 25 mg by mouth every morning. 3/18/22  Yes Historical Provider   hydrALAZINE (APRESOLINE) 100 MG tablet Take 100 mg by mouth 3 (three) times daily. 3/18/22  Yes Historical Provider   lamoTRIgine (LAMICTAL) 25 MG tablet Take 50 mg by mouth 2 (two) times daily. 22  Yes Historical Provider   sildenafil (REVATIO) 20 mg Tab SMARTSIG:3 Tablet(s) By Mouth PRN 4/20/22 7/3/22  Historical Provider       Family History:  No family history on file.    Social History:       Review of Systems:    Review of Systems   Constitutional: Positive for diaphoresis.   HENT: Negative.    Eyes: Negative.    Respiratory: Negative.    Cardiovascular: Negative.    Gastrointestinal: Negative.    Genitourinary: Negative.    Musculoskeletal: Negative.    Skin: Negative.    Neurological: Positive for dizziness and weakness.   Endo/Heme/Allergies: Negative.    Psychiatric/Behavioral: Negative.         Health Maintaince :   Primary Care Physician: No primary care provider on file.     Immunizations:     There is no immunization history on file for this patient.       Cancer Screening:  Unknown     Objective:   Last 24 Hour Vital Signs:  BP  Min: 122/59  Max: 148/70  Temp  Av.2 °F (36.8 °C)  Min: 98.2 °F (36.8 °C)  Max: 98.2 °F (36.8 °C)  Pulse  Av.4  Min: 62  Max: 72  Resp  Av  Min: 11  Max: 20  SpO2  Av.9 %  Min: 98 %  Max: 100 %  Height  Av' (182.9 cm)  Min: 6' (182.9 cm)  Max: 6' (182.9 cm)  Weight  Av kg (279 lb 15.8 oz)  Min: 127 kg (279 lb 15.8 oz)  Max:  127 kg (279 lb 15.8 oz)  Body mass index is 37.97 kg/m².  No intake/output data recorded.    Physical Examination:  Physical Exam     General: A&Ox3, resting comfortably in bed  HEENT: PERRL, EOMI, moist mucus membranes w/ no erythema noted, no facial asymmetry noted  Neck: Trachea midline, no masses, no lymphadenopathy, no JVD  Cardiovascular: Regular rate and rhythm, normal S1 and S2, no murmurs, rubs or gallops  Pulm: CTAB, no wheezes or crackles; no respiratory distress  Abdomen: Soft, non-tender, normal BS, non-distended; no organomegaly  Skin: No rashes or erythema noted, no ecchymosis  Extremities: atraumatic, no cyanosis or edema  Pulses: 2+ and symmetric  Neurological: Alert and oriented x 3.  Equal strength in upper and lower extremities bilaterally. Normal sensation. No facial droop. Normal speech.    Psychiatric: normal mood and affect, thought content normal      Laboratory:  CBC:   Lab Results   Component Value Date    WBC 8.84 07/03/2022    HGB 13.0 (L) 07/03/2022    HCT 37.1 (L) 07/03/2022     07/03/2022    MCV 82 07/03/2022    RDW 21.7 (H) 07/03/2022       BMP:   Lab Results   Component Value Date     07/03/2022    K 3.0 (L) 07/03/2022     07/03/2022    CO2 25 07/03/2022    BUN 16 07/03/2022    CREATININE 2.1 (H) 07/03/2022     07/03/2022    CALCIUM 9.2 07/03/2022     LFTs:   Lab Results   Component Value Date    PROT 7.8 07/03/2022    ALBUMIN 3.2 (L) 07/03/2022    BILITOT 6.9 (H) 07/03/2022     (H) 07/03/2022    ALKPHOS 190 (H) 07/03/2022     (H) 07/03/2022     Coags: No results found for: INR, PROTIME, PTT  FLP: No results found for: CHOL, HDL, LDLCALC, TRIG, CHOLHDL  DM:   Lab Results   Component Value Date    CREATININE 2.1 (H) 07/03/2022     Thyroid: No results found for: TSH, FREET4, C5WROVA, Y5PLIPN, THYROIDAB  Anemia: No results found for: IRON, TIBC, FERRITIN, AFXVRVPI53, FOLATE  Cardiac:   Lab Results   Component Value Date    TROPONINI 0.028 (H)  07/03/2022    BNP 36 07/03/2022     Urinalysis:   Lab Results   Component Value Date    COLORU Yellow 07/03/2022    SPECGRAV 1.015 07/03/2022    NITRITE Negative 07/03/2022    KETONESU Negative 07/03/2022    UROBILINOGEN >=8.0 (A) 07/03/2022       Trended Lab Data:  Recent Labs   Lab 07/03/22  1625   WBC 8.84   HGB 13.0*   HCT 37.1*      MCV 82   RDW 21.7*      K 3.0*      CO2 25   BUN 16   CREATININE 2.1*      PROT 7.8   ALBUMIN 3.2*   BILITOT 6.9*   *   ALKPHOS 190*   *       Trended Cardiac Data:  Recent Labs   Lab 07/03/22  1625   TROPONINI 0.028*   BNP 36       Microbiology:  Microbiology Results (last 7 days)     ** No results found for the last 168 hours. **           Other Results:    EKG :  No results found for this or any previous visit.     Radiology:  Imaging Results          X-Ray Chest AP Portable (Final result)  Result time 07/03/22 16:23:03    Final result by Gentry Grant MD (07/03/22 16:23:03)                 Impression:      No radiographic acute intrathoracic process seen on this single view.      Electronically signed by: Gentry Grant MD  Date:    07/03/2022  Time:    16:23             Narrative:    EXAMINATION:  XR CHEST AP PORTABLE    CLINICAL HISTORY:  Syncope and collapse    TECHNIQUE:  Single frontal view of the chest was performed.    COMPARISON:  None    FINDINGS:  Monitoring leads overlie the chest.The lungs are well expanded and clear, with normal appearance of pulmonary vasculature and no pleural effusion or pneumothorax.    The cardiac silhouette is normal in size. The hilar and mediastinal contours are unremarkable.    No acute osseous process seen.  PA and lateral views can be obtained.                                Assessment:     Niecy Bolivar is a 46 y.o. male with:  Patient Active Problem List    Diagnosis Date Noted    JOÃO (acute kidney injury) 07/03/2022    Elevated liver enzymes 07/03/2022    Hypokalemia 07/03/2022    Seizure  disorder 07/03/2022    Elevated troponin 07/03/2022    Hyperbilirubinemia 07/03/2022    Hypertension 07/03/2022     Pt is a 46yoM who [place] [facility] [place]/ near syncope for one day. Found to have been taking double his hydralazine dose for one month. Also found to have JOÃO with Cr of 2.1, transaminitis with  and , and hyperbilirubinemia with Tbilli 6.9. No history of renal disease or hepatic disease. No history of EtOH use. IV fluids given by ED with transfer to [facility] for further work up and management of JOÃO, hyperbilirubinemia, elevated liver function labs.        Plan:     JOÃO   -Cr on admit 2.1, baseline unknown  -likely 2/2 to low volume status due to accidental over use of home antihypertensives compounded by sweating outside in heat  -IV fluids given in ED  -Continue repletion on the floor  -Trend Cr   -Avoid nephrotoxic drugs, contrast      Near Syncope/ Dizziness  -Pt hypotensive per EMS to 90's upon their arrival  -Improved with fluid bolus  -No complaints of dizziness/fainting at this time  -Likely 2/2 to taking double hydralazine dose and compounded by hot weather  -CTM     Hyperbilirubinemia  -Tbilli 6.9 on admit  -Fractionation with direct bilirubin ordered   -Abdominal US  -Daily CMP    Elevated Liver Enzymes  -,  on admit  -Hepatitis Panel    Hypokalemia  -K 3 on admit  -Replete with Oral K tablet 10meq    Essential HTN  -patient on chlorthalidone 25 mg daily, amlodipine 10mg daily, carvedilol 12.5mg BID, hydralazine 100mg (0.5 tablets) TID  - Patient reports doubling hydralazine dose for last month  - Will resume amlodipine and will titrate PRN    Seizure Disorder  -Seizure free for years  -Continue home Lamictal 50mg       Anticoagulants   Medication Route Frequency    heparin (porcine) injection 5,000 Units Subcutaneous Q8H      Full Code    Diet: Reg  Code: Full  PPx: Heparin  Dispo: pending Liver Function/Hyperbilli work up and resolution of  JOÃO Becker MD  [facility] HO-1  [facility] Internal Medicine Service  [facility] Hospitalist Medicine Team     [facility] Hospitalist Pager numbers:   [facility] Hospitalist Medicine Team A (Sirena/Laura): [phone]  [facility] Hospitalist Medicine Team B (Rober/Preston):  [phone]

## 2022-07-05 LAB
ALBUMIN SERPL BCP-MCNC: 2.8 G/DL (ref 3.5–5.2)
ALP SERPL-CCNC: 177 U/L (ref 55–135)
ALT SERPL W/O P-5'-P-CCNC: 481 U/L (ref 10–44)
ANION GAP SERPL CALC-SCNC: 13 MMOL/L (ref 8–16)
AST SERPL-CCNC: 414 U/L (ref 10–40)
BASOPHILS # BLD AUTO: 0.09 K/UL (ref 0–0.2)
BASOPHILS NFR BLD: 1.2 % (ref 0–1.9)
BILIRUB SERPL-MCNC: 7.8 MG/DL (ref 0.1–1)
BUN SERPL-MCNC: 19 MG/DL (ref 6–20)
CALCIUM SERPL-MCNC: 9.1 MG/DL (ref 8.7–10.5)
CHLORIDE SERPL-SCNC: 103 MMOL/L (ref 95–110)
CK SERPL-CCNC: 171 U/L (ref 20–200)
CO2 SERPL-SCNC: 23 MMOL/L (ref 23–29)
CREAT SERPL-MCNC: 1.8 MG/DL (ref 0.5–1.4)
DIFFERENTIAL METHOD: ABNORMAL
EOSINOPHIL # BLD AUTO: 0.2 K/UL (ref 0–0.5)
EOSINOPHIL NFR BLD: 2.8 % (ref 0–8)
ERYTHROCYTE [DISTWIDTH] IN BLOOD BY AUTOMATED COUNT: 21.7 % (ref 11.5–14.5)
EST. GFR  (AFRICAN AMERICAN): 52 ML/MIN/1.73 M^2
EST. GFR  (NON AFRICAN AMERICAN): 45 ML/MIN/1.73 M^2
GLUCOSE SERPL-MCNC: 91 MG/DL (ref 70–110)
HCT VFR BLD AUTO: 35.4 % (ref 40–54)
HGB BLD-MCNC: 12.4 G/DL (ref 14–18)
IMM GRANULOCYTES # BLD AUTO: 0.01 K/UL (ref 0–0.04)
IMM GRANULOCYTES NFR BLD AUTO: 0.1 % (ref 0–0.5)
INR PPP: 1.2 (ref 0.8–1.2)
LDH SERPL L TO P-CCNC: 427 U/L (ref 110–260)
LYMPHOCYTES # BLD AUTO: 3 K/UL (ref 1–4.8)
LYMPHOCYTES NFR BLD: 38.5 % (ref 18–48)
MAGNESIUM SERPL-MCNC: 1.9 MG/DL (ref 1.6–2.6)
MCH RBC QN AUTO: 28.8 PG (ref 27–31)
MCHC RBC AUTO-ENTMCNC: 35 G/DL (ref 32–36)
MCV RBC AUTO: 82 FL (ref 82–98)
MONOCYTES # BLD AUTO: 0.9 K/UL (ref 0.3–1)
MONOCYTES NFR BLD: 11.5 % (ref 4–15)
NEUTROPHILS # BLD AUTO: 3.6 K/UL (ref 1.8–7.7)
NEUTROPHILS NFR BLD: 45.9 % (ref 38–73)
NRBC BLD-RTO: 0 /100 WBC
PHOSPHATE SERPL-MCNC: 3.8 MG/DL (ref 2.7–4.5)
PLATELET # BLD AUTO: 240 K/UL (ref 150–450)
PMV BLD AUTO: 10.3 FL (ref 9.2–12.9)
POTASSIUM SERPL-SCNC: 3 MMOL/L (ref 3.5–5.1)
PROT SERPL-MCNC: 7.2 G/DL (ref 6–8.4)
PROTHROMBIN TIME: 12.4 SEC (ref 9–12.5)
RBC # BLD AUTO: 4.31 M/UL (ref 4.6–6.2)
SODIUM SERPL-SCNC: 139 MMOL/L (ref 136–145)
WBC # BLD AUTO: 7.82 K/UL (ref 3.9–12.7)

## 2022-07-05 PROCEDURE — 96372 THER/PROPH/DIAG INJ SC/IM: CPT | Performed by: STUDENT IN AN ORGANIZED HEALTH CARE EDUCATION/TRAINING PROGRAM

## 2022-07-05 PROCEDURE — 25000003 PHARM REV CODE 250: Performed by: STUDENT IN AN ORGANIZED HEALTH CARE EDUCATION/TRAINING PROGRAM

## 2022-07-05 PROCEDURE — 82550 ASSAY OF CK (CPK): CPT | Performed by: INTERNAL MEDICINE

## 2022-07-05 PROCEDURE — 99232 SBSQ HOSP IP/OBS MODERATE 35: CPT | Mod: ,,, | Performed by: INTERNAL MEDICINE

## 2022-07-05 PROCEDURE — 36415 COLL VENOUS BLD VENIPUNCTURE: CPT | Performed by: INTERNAL MEDICINE

## 2022-07-05 PROCEDURE — 63600175 PHARM REV CODE 636 W HCPCS: Performed by: STUDENT IN AN ORGANIZED HEALTH CARE EDUCATION/TRAINING PROGRAM

## 2022-07-05 PROCEDURE — 63600175 PHARM REV CODE 636 W HCPCS

## 2022-07-05 PROCEDURE — 99232 PR SUBSEQUENT HOSPITAL CARE,LEVL II: ICD-10-PCS | Mod: ,,, | Performed by: INTERNAL MEDICINE

## 2022-07-05 PROCEDURE — 85610 PROTHROMBIN TIME: CPT | Performed by: INTERNAL MEDICINE

## 2022-07-05 PROCEDURE — 84100 ASSAY OF PHOSPHORUS: CPT | Performed by: STUDENT IN AN ORGANIZED HEALTH CARE EDUCATION/TRAINING PROGRAM

## 2022-07-05 PROCEDURE — 83735 ASSAY OF MAGNESIUM: CPT | Performed by: STUDENT IN AN ORGANIZED HEALTH CARE EDUCATION/TRAINING PROGRAM

## 2022-07-05 PROCEDURE — 11000001 HC ACUTE MED/SURG PRIVATE ROOM

## 2022-07-05 PROCEDURE — 80053 COMPREHEN METABOLIC PANEL: CPT | Performed by: STUDENT IN AN ORGANIZED HEALTH CARE EDUCATION/TRAINING PROGRAM

## 2022-07-05 PROCEDURE — 83615 LACTATE (LD) (LDH) ENZYME: CPT | Performed by: INTERNAL MEDICINE

## 2022-07-05 PROCEDURE — 85025 COMPLETE CBC W/AUTO DIFF WBC: CPT | Performed by: STUDENT IN AN ORGANIZED HEALTH CARE EDUCATION/TRAINING PROGRAM

## 2022-07-05 RX ORDER — MAGNESIUM SULFATE HEPTAHYDRATE 40 MG/ML
2 INJECTION, SOLUTION INTRAVENOUS ONCE
Status: COMPLETED | OUTPATIENT
Start: 2022-07-05 | End: 2022-07-05

## 2022-07-05 RX ORDER — LORAZEPAM 2 MG/ML
1 INJECTION INTRAMUSCULAR ONCE
Status: COMPLETED | OUTPATIENT
Start: 2022-07-05 | End: 2022-07-05

## 2022-07-05 RX ORDER — POTASSIUM CHLORIDE 750 MG/1
10 TABLET, EXTENDED RELEASE ORAL
Status: COMPLETED | OUTPATIENT
Start: 2022-07-05 | End: 2022-07-05

## 2022-07-05 RX ORDER — SODIUM CHLORIDE AND POTASSIUM CHLORIDE 300; 900 MG/100ML; MG/100ML
INJECTION, SOLUTION INTRAVENOUS CONTINUOUS
Status: DISPENSED | OUTPATIENT
Start: 2022-07-05 | End: 2022-07-06

## 2022-07-05 RX ADMIN — HEPARIN SODIUM 5000 UNITS: 5000 INJECTION INTRAVENOUS; SUBCUTANEOUS at 08:07

## 2022-07-05 RX ADMIN — HEPARIN SODIUM 5000 UNITS: 5000 INJECTION INTRAVENOUS; SUBCUTANEOUS at 06:07

## 2022-07-05 RX ADMIN — POTASSIUM CHLORIDE 10 MEQ: 750 TABLET, EXTENDED RELEASE ORAL at 02:07

## 2022-07-05 RX ADMIN — AMLODIPINE BESYLATE 10 MG: 5 TABLET ORAL at 09:07

## 2022-07-05 RX ADMIN — LAMOTRIGINE 50 MG: 25 TABLET ORAL at 08:07

## 2022-07-05 RX ADMIN — SODIUM CHLORIDE AND POTASSIUM CHLORIDE: 9; 2.98 INJECTION, SOLUTION INTRAVENOUS at 12:07

## 2022-07-05 RX ADMIN — LORAZEPAM 1 MG: 2 INJECTION INTRAMUSCULAR; INTRAVENOUS at 10:07

## 2022-07-05 RX ADMIN — POTASSIUM CHLORIDE 10 MEQ: 750 TABLET, EXTENDED RELEASE ORAL at 12:07

## 2022-07-05 RX ADMIN — HEPARIN SODIUM 5000 UNITS: 5000 INJECTION INTRAVENOUS; SUBCUTANEOUS at 02:07

## 2022-07-05 RX ADMIN — POTASSIUM CHLORIDE 10 MEQ: 750 TABLET, EXTENDED RELEASE ORAL at 03:07

## 2022-07-05 RX ADMIN — SODIUM CHLORIDE AND POTASSIUM CHLORIDE: 9; 2.98 INJECTION, SOLUTION INTRAVENOUS at 08:07

## 2022-07-05 RX ADMIN — LAMOTRIGINE 50 MG: 25 TABLET ORAL at 09:07

## 2022-07-05 RX ADMIN — MAGNESIUM SULFATE HEPTAHYDRATE 2 G: 40 INJECTION, SOLUTION INTRAVENOUS at 12:07

## 2022-07-05 NOTE — PLAN OF CARE
Problem: Adult Inpatient Plan of Care  Goal: Plan of Care Review  Outcome: Ongoing, Progressing     Problem: Fluid and Electrolyte Imbalance (Acute Kidney Injury/Impairment)  Goal: Fluid and Electrolyte Balance  Outcome: Ongoing, Progressing     Problem: Oral Intake Inadequate (Acute Kidney Injury/Impairment)  Goal: Optimal Nutrition Intake  Outcome: Ongoing, Progressing     Problem: Fall Injury Risk  Goal: Absence of Fall and Fall-Related Injury  Outcome: Ongoing, Progressing

## 2022-07-05 NOTE — PLAN OF CARE
Patient AAOx4, VSS, patient denies any pain. Patient ambulating in room. Free from falls. Patient denies any pain throughout shift. Patient in NAD. Patient tolerating diet, no nausea or vomiting. Call bell in reach. Safety maintained. Will continue to monitor.   Problem: Adult Inpatient Plan of Care  Goal: Plan of Care Review  Outcome: Ongoing, Progressing  Goal: Patient-Specific Goal (Individualized)  Outcome: Ongoing, Progressing  Goal: Absence of Hospital-Acquired Illness or Injury  Outcome: Ongoing, Progressing  Goal: Optimal Comfort and Wellbeing  Outcome: Ongoing, Progressing  Goal: Readiness for Transition of Care  Outcome: Ongoing, Progressing     Problem: Fluid and Electrolyte Imbalance (Acute Kidney Injury/Impairment)  Goal: Fluid and Electrolyte Balance  Outcome: Ongoing, Progressing     Problem: Oral Intake Inadequate (Acute Kidney Injury/Impairment)  Goal: Optimal Nutrition Intake  Outcome: Ongoing, Progressing     Problem: Renal Function Impairment (Acute Kidney Injury/Impairment)  Goal: Effective Renal Function  Outcome: Ongoing, Progressing     Problem: Fall Injury Risk  Goal: Absence of Fall and Fall-Related Injury  Outcome: Ongoing, Progressing

## 2022-07-05 NOTE — CONSULTS
[facility] Surg  Gastroenterology  Consult Note    Patient Name: Niecy Bolivar  MRN: [id number]  Admission Date: 7/3/2022  Hospital Length of Stay: 0 days  Code Status: Full Code   Attending Provider: Greg Pack MD   Consulting Provider: George Gillespie MD  Primary Care Physician: No primary care provider on file.  Principal Problem:JOÃO (acute kidney injury)    Inpatient consult to G[facility]enterology-[facility]  Consult performed by: George Gillespie MD  Consult ordered by: Anam Beck MD        Subjective:     HPI:  This is a 46-year-old male with a history of hypertension who presented to the emergency department with a syncopal episode.  GI is now consulted for elevated LFTs.  Patient states he is in town on work and was taking double dose of his blood pressure medicine because he did not want to take it later on in the day.  He was then having a bowel movement and when he tried to get up he had a syncopal episode.  There was no preceding abdominal pain.  There is no nausea there is no vomiting.  There is no blood in his stool.  He currently denies any abdominal symptoms.  Never had anything like this before.  No prior history of liver disease.  No prior history of hepatitis.  No alcohol use.  No intravenous or otherwise drug use.      No past medical history on file.    No past surgical history on file.    Review of patient's allergies indicates:  No Known Allergies  Family History    None       Tobacco Use    Smoking status: Not on file    Smokeless tobacco: Not on file   Substance and Sexual Activity    Alcohol use: Not on file    Drug use: Not on file    Sexual activity: Not on file     Review of Systems   Constitutional:  Negative for chills and fever.   HENT:  Negative for facial swelling, mouth sores and trouble swallowing.    Eyes:  Negative for pain and redness.   Respiratory:  Negative for cough and shortness of breath.    Cardiovascular:  Negative for chest pain and leg swelling.    Gastrointestinal:  Negative for abdominal distention and abdominal pain.   Genitourinary:  Negative for dysuria and hematuria.   Musculoskeletal:  Negative for gait problem and neck stiffness.   Skin:  Negative for rash and wound.   Neurological:  Positive for syncope. Negative for seizures and headaches.   Psychiatric/Behavioral:  Negative for confusion and hallucinations.    Objective:     Vital Signs (Most Recent):  Temp: 98.3 °F (36.8 °C) (07/04/22 2033)  Pulse: 65 (07/04/22 2033)  Resp: 18 (07/04/22 2033)  BP: (!) 148/86 (07/04/22 2033)  SpO2: 98 % (07/04/22 2033)   Vital Signs (24h Range):  Temp:  [97.4 °F (36.3 °C)-98.3 °F (36.8 °C)] 98.3 °F (36.8 °C)  Pulse:  [65-74] 65  Resp:  [14-20] 18  SpO2:  [94 %-100 %] 98 %  BP: (137-156)/(65-90) 148/86     Weight: 127 kg (279 lb 15.8 oz) (07/03/22 1523)  Body mass index is 37.97 kg/m².      Intake/Output Summary (Last 24 hours) at 7/4/2022 2135  Last data filed at 7/4/2022 1500  Gross per 24 hour   Intake 1050 ml   Output 675 ml   Net 375 ml       Lines/Drains/Airways       Peripheral Intravenous Line  Duration                  Peripheral IV - Single Lumen 07/03/22 1430 18 G Right Antecubital 1 day                    Physical Exam  Vitals reviewed.   Constitutional:       General: He is not in acute distress.     Appearance: He is well-developed.   HENT:      Head: Normocephalic and atraumatic.   Eyes:      General: No scleral icterus.     Conjunctiva/sclera: Conjunctivae normal.   Neck:      Thyroid: No thyromegaly.   Cardiovascular:      Rate and Rhythm: Normal rate and regular rhythm.   Pulmonary:      Effort: Pulmonary effort is normal. No respiratory distress.      Breath sounds: Normal breath sounds.   Abdominal:      General: There is no distension.      Palpations: Abdomen is soft.      Tenderness: There is no abdominal tenderness.   Musculoskeletal:         General: No tenderness. Normal range of motion.      Cervical back: Neck supple.   Lymphadenopathy:       Cervical: No cervical adenopathy.   Skin:     General: Skin is warm and dry.      Findings: No rash.   Neurological:      Mental Status: He is alert and oriented to person, place, and time.      Gait: Gait normal.   Psychiatric:         Mood and Affect: Mood normal.         Behavior: Behavior normal.       Significant Labs:  CBC:   Recent Labs   Lab 07/03/22  1625 07/04/22  0502   WBC 8.84 9.13   HGB 13.0* 12.4*   HCT 37.1* 35.6*    235     BMP:   Recent Labs   Lab 07/04/22  0502         K 3.0*      CO2 26   BUN 15   CREATININE 1.7*   CALCIUM 9.0   MG 1.7     CMP:   Recent Labs   Lab 07/04/22  0502      CALCIUM 9.0   ALBUMIN 2.8*   PROT 7.0      K 3.0*   CO2 26      BUN 15   CREATININE 1.7*   ALKPHOS 183*   *   *   BILITOT 7.0*     Coagulation: No results for input(s): PT, INR, APTT in the last 48 hours.    Significant Imaging:  Imaging results within the past 24 hours have been reviewed.    Assessment/Plan:     Elevated liver enzymes  With syncopal episode    Suspected ischemic in nature    I suspect the bili , which typically lags behind, will begin to downtrend tomorrow    IF Bili continues to rise, I would obtain MRCP    Recs:  Trend lfts  Await hepatitis panel, low supsicion  I wll check LDH as this is surrogate marker for hepatic ischemia  Check CPK    Supportive care    IF LFTs downtrending tomorrow, ok for d/c and can have follow up back home        Thank you for your consult. I will follow-up with patient. Please contact us if you have any additional questions.    George Gillespie MD  Gastroenterology  [facility]

## 2022-07-05 NOTE — PROGRESS NOTES
[facility] Resident Progress Note    Admitting Team: [facility] Hospitalist Team A   Attending Physician: Greg Pack MD   Resident:  Ebony  Intern: Eugenio    Date of Admit: 7/3/2022    Subjective:      NAEON    This AM, patient is resting comfortably. No complaints at this time. Denies any dizziness, sweating, pain, f/c/n/v.      Objective:   Last 24 Hour Vital Signs:  BP  Min: 137/77  Max: 150/68  Temp  Av.1 °F (36.7 °C)  Min: 97.4 °F (36.3 °C)  Max: 98.4 °F (36.9 °C)  Pulse  Av.5  Min: 63  Max: 69  Resp  Av.3  Min: 12  Max: 20  SpO2  Av %  Min: 96 %  Max: 100 %  Weight  Av.1 kg (282 lb 6.6 oz)  Min: 128.1 kg (282 lb 6.6 oz)  Max: 128.1 kg (282 lb 6.6 oz)  I/O last 3 completed shifts:  In:  [I.V.:1050; IV Piggyback:1000]  Out: 675 [Urine:675]    Physical Examination:    Physical Exam     General: A&Ox3, resting comfortably in bed  HEENT: mild jaundice of sclera and frenulum of tongue.PERRL, EOMI, moist mucus membranes w/ no erythema noted, no facial asymmetry noted  Neck: Trachea midline, no masses, no lymphadenopathy, no JVD  Cardiovascular: Regular rate and rhythm, normal S1 and S2, no murmurs, rubs or gallops  Pulm: CTAB, no wheezes or crackles; no respiratory distress  Abdomen: Soft, non-tender, normal BS, non-distended; no organomegaly  Skin: No rashes or erythema noted, no ecchymosis  Extremities: atraumatic, no cyanosis or edema  Pulses: 2+ and symmetric  Neurological: A&Ox3, no focal deficits  Psychiatric: normal mood and affect, thought content normal    Laboratory:  Trended Lab Data:  Recent Labs   Lab 22  1625 22  0502 22  0510   WBC 8.84 9.13 7.82   HGB 13.0* 12.4* 12.4*   HCT 37.1* 35.6* 35.4*    235 240   MCV 82 83 82   RDW 21.7* 21.6* 21.7*    144 139   K 3.0* 3.0* 3.0*    106 103   CO2 25 26 23   BUN 16 15 19   CREATININE 2.1* 1.7* 1.8*    104 91   PROT 7.8 7.0 7.2   ALBUMIN 3.2* 2.8* 2.8*   BILITOT 6.9* 7.0* 7.8*   * 261*  414*   ALKPHOS 190* 183* 177*   * 411* 481*       Trended Cardiac Data:  Recent Labs   Lab 07/03/22  1625 07/04/22  1134 07/04/22  1801   TROPONINI 0.028* 0.018 0.015   BNP 36  --   --        Microbiology:  Microbiology Results (last 7 days)     ** No results found for the last 168 hours. **           Other Results:  EKG (my interpretation):     No results found for this or any previous visit.     Radiology:  Imaging Results          US Abdomen Complete (Final result)  Result time 07/03/22 22:20:00    Final result by Kit Johnson DO (07/03/22 22:20:00)                 Impression:      Nonspecific gallbladder wall thickening which may be reactive or may be accentuated by gallbladder contraction.  No cholelithiasis or sonographic evidence of acute cholecystitis.    Hepatomegaly.      Electronically signed by: Kit Johnson  Date:    07/03/2022  Time:    22:20             Narrative:    EXAMINATION:  US ABDOMEN COMPLETE    CLINICAL HISTORY:  hyperbilirubinemia;    TECHNIQUE:  Complete abdominal ultrasound (including pancreas, aorta, liver, gallbladder, common bile duct, IVC, kidneys, and spleen) was performed.    COMPARISON:  None    FINDINGS:  Pancreas: The visualized portions of pancreas appear normal.    Aorta: No aneurysm.    Liver: 17.8 cm, enlarged.  Homogeneous parenchymal echotexture. No focal lesions.    Gallbladder: There is gallbladder wall thickening, measuring up to 6 mm.  The gallbladder is contracted.  There is gallbladder wall hyperemia.  No calculi or pericholecystic fluid.  Negative sonographic Calderón's sign.    Biliary system: 6 mm common bile duct.  No intrahepatic ductal dilatation.    Inferior vena cava: Normal in appearance.    Right kidney: 9.9 cm. No hydronephrosis.    Left kidney: 10.0 cm. No hydronephrosis.    Spleen: 10.3 x 3.6 cm.  Normal in size with homogeneous echotexture.    Miscellaneous: No ascites.                               X-Ray Chest AP Portable (Final result)   Result time 07/03/22 16:23:03    Final result by Gentry Grant MD (07/03/22 16:23:03)                 Impression:      No radiographic acute intrathoracic process seen on this single view.      Electronically signed by: Gentry Grant MD  Date:    07/03/2022  Time:    16:23             Narrative:    EXAMINATION:  XR CHEST AP PORTABLE    CLINICAL HISTORY:  Syncope and collapse    TECHNIQUE:  Single frontal view of the chest was performed.    COMPARISON:  None    FINDINGS:  Monitoring leads overlie the chest.The lungs are well expanded and clear, with normal appearance of pulmonary vasculature and no pleural effusion or pneumothorax.    The cardiac silhouette is normal in size. The hilar and mediastinal contours are unremarkable.    No acute osseous process seen.  PA and lateral views can be obtained.                                 Current Medications:     Infusions:      Scheduled:   amLODIPine  10 mg Oral Daily    heparin (porcine)  5,000 Units Subcutaneous Q8H    lamoTRIgine  50 mg Oral BID        PRN:  naloxone, sodium chloride 0.9%    Antibiotics (From admission, onward)            None             Assessment:     Niecy Bolivar is a 46 y.o.male with  Patient Active Problem List    Diagnosis Date Noted    Elevated LFTs     Near syncope     Serum total bilirubin elevated     JOÃO (acute kidney injury) 07/03/2022    Elevated liver enzymes 07/03/2022    Hypokalemia 07/03/2022    Seizure disorder 07/03/2022    Elevated troponin 07/03/2022    Hyperbilirubinemia 07/03/2022    Hypertension 07/03/2022      Pt is a 46yoM who presents to [facility] for dizziness w/ near syncope for one day. Found to have been taking double his hydralazine dose for one month. Also found to have JOÃO with Cr of 2.1, transaminitis with  and , and hyperbilirubinemia with Tbilli 6.9. No history of renal disease or hepatic disease. No history of EtOH use. IV fluids given by ED with transfer to [facility] for further work  up and management of JOÃO, hyperbilirubinemia, elevated liver function labs. Renal function improved on 07/04 with Cr 1.7. AST and ALT improved as well to 261 and 411, respectively. Tbilli increased to 7 and direct bilirubin found to be elevated at 5.1 on 07/04. Hepatitis Panels still in progress. GI consulted and recommend repeat bilirubin as they suspect increase due to ischemia and that levels will begin to downtrend. They recommend MRCP if levels continue to rise. Tbilli increased to 7.8 this AM and increase in  and . Will order MRCP per GI recs today and await further recs.      Plan:        JOÃO   -Cr on admit 2.1, baseline unknown  -likely 2/2 to low volume status due to accidental over use of home antihypertensives compounded by sweating outside in heat  -Cr improved to 1.7 on 07/04 with IV fluids  -Continue to replete  -Avoid nephrotoxic drugs, contrast        Near Syncope/ Dizziness  -Pt hypotensive per EMS to 90's upon their arrival  -Improved with fluid bolus  -No complaints of dizziness/fainting at this time  -Likely 2/2 to taking double hydralazine dose and compounded by hot weather  -CTM      Hyperbilirubinemia  -Tbilli 6.9 on admit  -Direct Bilirubin elevated at 5.1  -Tbilli trending upward at 7 (07/04) and 7.8 (07/05)  -GI consulted and recommended trending Tbilli with MRCP suggested if continued uptrend  -Abdominal US with gall bladder wall thickening, enlarged liver  -Daily CMP  -MRCP ordered, ativan 1mg administered for claustrophobia     Elevated Liver Enzymes  #transaminitis (worsening)  #elevated Alk phos (improving)  -, , Alk phos 190 on admit  -Hepatitis Panel in progress  -Improved to , , Alk phos 183 on 07/04  -Worsened again to ,    -MRCP ordered per GI recs     Hypokalemia/Hypomag  -K 3, Mg 1.7 on admit  -S/p K 50meq on 07/03, s/p K 50 meq on 07/04  -S/p Mg 2g on 07/04  -No change in K overnight on 06/03, remains at 3 morning of  06/04  -This AM again no change in K overnight with levels remaining at 3  -CTM and will replete PRN  -Mg 1.9 this AM, Mg 2g ordered        Essential HTN  -patient on chlorthalidone 25 mg daily, amlodipine 10mg daily, carvedilol 12.5mg BID, hydralazine 100mg (0.5 tablets) TID  -Patient reports doubling hydralazine dose for last month  -Amlodipine resumed and will titrate PRN  -Pressures remain slightly elevated at 140s/80s       Seizure Disorder  -Seizure free for years  -Continue home Lamictal 50mg    Diet: Reg  Code: Full  PPx: Hep  Dispo: Hyperbilirubinemia work up with possible MRCP and continued resolution of JOÃO    Gigi Becker MD  [facility] Neurology HO-1  [facility] Internal Medicine Service   [facility] Hospitalist Team     [facility] Medicine Hospitalist Pager numbers:   [facility] Hospitalist Medicine Team A (Sirena/Laura): [phone]  [facility] Hospitalist Medicine Team B (Rober/Preston):  [phone]

## 2022-07-05 NOTE — ASSESSMENT & PLAN NOTE
With syncopal episode    Suspected ischemic in nature    Recommendations:  - Continue to trend LFT's  - Plan for MRCP today as continued rise in LFT's and T. Bili  - Supportive care

## 2022-07-05 NOTE — ASSESSMENT & PLAN NOTE
With syncopal episode    Suspected ischemic in nature    I suspect the bili , which typically lags behind, will begin to downtrend tomorrow    IF Bili continues to rise, I would obtain MRCP    Recs:  Trend lfts  Await hepatitis panel, low supsicion  I wll check LDH as this is surrogate marker for hepatic ischemia  Check CPK    Supportive care    IF LFTs downtrending tomorrow, ok for d/c and can have follow up back home

## 2022-07-05 NOTE — SUBJECTIVE & OBJECTIVE
No past medical history on file.    No past surgical history on file.    Review of patient's allergies indicates:  No Known Allergies  Family History    None       Tobacco Use    Smoking status: Not on file    Smokeless tobacco: Not on file   Substance and Sexual Activity    Alcohol use: Not on file    Drug use: Not on file    Sexual activity: Not on file     Review of Systems   Constitutional:  Negative for chills and fever.   HENT:  Negative for facial swelling, mouth sores and trouble swallowing.    Eyes:  Negative for pain and redness.   Respiratory:  Negative for cough and shortness of breath.    Cardiovascular:  Negative for chest pain and leg swelling.   Gastrointestinal:  Negative for abdominal distention and abdominal pain.   Genitourinary:  Negative for dysuria and hematuria.   Musculoskeletal:  Negative for gait problem and neck stiffness.   Skin:  Negative for rash and wound.   Neurological:  Positive for syncope. Negative for seizures and headaches.   Psychiatric/Behavioral:  Negative for confusion and hallucinations.    Objective:     Vital Signs (Most Recent):  Temp: 98.3 °F (36.8 °C) (07/04/22 2033)  Pulse: 65 (07/04/22 2033)  Resp: 18 (07/04/22 2033)  BP: (!) 148/86 (07/04/22 2033)  SpO2: 98 % (07/04/22 2033)   Vital Signs (24h Range):  Temp:  [97.4 °F (36.3 °C)-98.3 °F (36.8 °C)] 98.3 °F (36.8 °C)  Pulse:  [65-74] 65  Resp:  [14-20] 18  SpO2:  [94 %-100 %] 98 %  BP: (137-156)/(65-90) 148/86     Weight: 127 kg (279 lb 15.8 oz) (07/03/22 1523)  Body mass index is 37.97 kg/m².      Intake/Output Summary (Last 24 hours) at 7/4/2022 2135  Last data filed at 7/4/2022 1500  Gross per 24 hour   Intake 1050 ml   Output 675 ml   Net 375 ml       Lines/Drains/Airways       Peripheral Intravenous Line  Duration                  Peripheral IV - Single Lumen 07/03/22 1430 18 G Right Antecubital 1 day                    Physical Exam  Vitals reviewed.   Constitutional:       General: He is not in acute  distress.     Appearance: He is well-developed.   HENT:      Head: Normocephalic and atraumatic.   Eyes:      General: No scleral icterus.     Conjunctiva/sclera: Conjunctivae normal.   Neck:      Thyroid: No thyromegaly.   Cardiovascular:      Rate and Rhythm: Normal rate and regular rhythm.   Pulmonary:      Effort: Pulmonary effort is normal. No respiratory distress.      Breath sounds: Normal breath sounds.   Abdominal:      General: There is no distension.      Palpations: Abdomen is soft.      Tenderness: There is no abdominal tenderness.   Musculoskeletal:         General: No tenderness. Normal range of motion.      Cervical back: Neck supple.   Lymphadenopathy:      Cervical: No cervical adenopathy.   Skin:     General: Skin is warm and dry.      Findings: No rash.   Neurological:      Mental Status: He is alert and oriented to person, place, and time.      Gait: Gait normal.   Psychiatric:         Mood and Affect: Mood normal.         Behavior: Behavior normal.       Significant Labs:  CBC:   Recent Labs   Lab 07/03/22  1625 07/04/22  0502   WBC 8.84 9.13   HGB 13.0* 12.4*   HCT 37.1* 35.6*    235     BMP:   Recent Labs   Lab 07/04/22  0502         K 3.0*      CO2 26   BUN 15   CREATININE 1.7*   CALCIUM 9.0   MG 1.7     CMP:   Recent Labs   Lab 07/04/22  0502      CALCIUM 9.0   ALBUMIN 2.8*   PROT 7.0      K 3.0*   CO2 26      BUN 15   CREATININE 1.7*   ALKPHOS 183*   *   *   BILITOT 7.0*     Coagulation: No results for input(s): PT, INR, APTT in the last 48 hours.    Significant Imaging:  Imaging results within the past 24 hours have been reviewed.

## 2022-07-05 NOTE — PROGRESS NOTES
[facility] Surg  Gastroenterology  Progress Note    Patient Name: Niecy Bolivar  MRN: [id number]  Admission Date: 7/3/2022  Hospital Length of Stay: 0 days  Code Status: Full Code   Attending Provider: Greg Pack MD  Consulting Provider: Deborah Sanchez MD  Primary Care Physician: No primary care provider on file.  Principal Problem: JOÃO (acute kidney injury)      Subjective:     Interval History: Mr. Bolivar reports doing well overnight. He denies any further syncopal episodes and reports no abdominal complaints at this time. Discussed MRCP today and he stated he does have issues with claustrophobia. Will discuss with primary team.    Review of Systems   Constitutional:  Negative for appetite change and fever.   Gastrointestinal:  Negative for abdominal pain, blood in stool, constipation, diarrhea, nausea and vomiting.   Musculoskeletal:  Negative for back pain.   Skin:         Scleral icterus   Neurological:  Negative for dizziness and weakness.   Hematological:  Does not bruise/bleed easily.   Objective:     Vital Signs (Most Recent):  Temp: 98.4 °F (36.9 °C) (07/05/22 0749)  Pulse: 63 (07/05/22 0749)  Resp: 18 (07/05/22 0749)  BP: (!) 148/82 (07/05/22 0749)  SpO2: 96 % (07/05/22 0749)   Vital Signs (24h Range):  Temp:  [97.4 °F (36.3 °C)-98.4 °F (36.9 °C)] 98.4 °F (36.9 °C)  Pulse:  [63-69] 63  Resp:  [12-20] 18  SpO2:  [96 %-100 %] 96 %  BP: (137-150)/(68-87) 148/82     Weight: 128.1 kg (282 lb 6.6 oz) (07/04/22 2321)  Body mass index is 38.3 kg/m².      Intake/Output Summary (Last 24 hours) at 7/5/2022 0927  Last data filed at 7/4/2022 1500  Gross per 24 hour   Intake 1050 ml   Output 675 ml   Net 375 ml       Lines/Drains/Airways       Peripheral Intravenous Line  Duration                  Peripheral IV - Single Lumen 07/03/22 1430 18 G Right Antecubital 1 day                    Physical Exam  Constitutional:       Appearance: He is not toxic-appearing.   HENT:      Head: Normocephalic and atraumatic.       Nose: Nose normal.      Mouth/Throat:      Mouth: Mucous membranes are moist.      Pharynx: Oropharynx is clear.   Eyes:      General: Scleral icterus present.      Extraocular Movements: Extraocular movements intact.   Cardiovascular:      Rate and Rhythm: Normal rate and regular rhythm.      Pulses: Normal pulses.   Pulmonary:      Effort: Pulmonary effort is normal.   Abdominal:      General: Bowel sounds are normal. There is no distension.      Palpations: Abdomen is soft.      Tenderness: There is no abdominal tenderness.   Musculoskeletal:         General: No swelling.   Skin:     General: Skin is warm and dry.      Findings: No bruising.   Neurological:      General: No focal deficit present.      Mental Status: He is alert and oriented to person, place, and time. Mental status is at baseline.       Significant Labs:  Acute Hepatitis Panel:   Recent Labs   Lab 07/03/22  1959   HEPBSAG Negative   HEPAIGM Negative   HEPCAB Negative     CBC:   Recent Labs   Lab 07/03/22  1625 07/04/22  0502 07/05/22  0510   WBC 8.84 9.13 7.82   HGB 13.0* 12.4* 12.4*   HCT 37.1* 35.6* 35.4*    235 240     CMP:   Recent Labs   Lab 07/05/22  0510   GLU 91   CALCIUM 9.1   ALBUMIN 2.8*   PROT 7.2      K 3.0*   CO2 23      BUN 19   CREATININE 1.8*   ALKPHOS 177*   *   *   BILITOT 7.8*     Coagulation:   Recent Labs   Lab 07/05/22  0510   INR 1.2         Significant Imaging:  No new imaging today    Assessment/Plan:     Elevated liver enzymes  With syncopal episode    Suspected ischemic in nature    Recommendations:  - Continue to trend LFT's  - Plan for MRCP today as continued rise in LFT's and T. Bili  - Supportive care      Thank you for your consult. I will follow-up with patient. Please contact us if you have any additional questions.    Deborah Sanchez MD  Gastroenterology  [facility] Surg

## 2022-07-05 NOTE — SUBJECTIVE & OBJECTIVE
Subjective:     Interval History: Mr. Bolivar reports doing well overnight. He denies any further syncopal episodes and reports no abdominal complaints at this time. Discussed MRCP today and he stated he does have issues with claustrophobia. Will discuss with primary team.    Review of Systems   Constitutional:  Negative for appetite change and fever.   Gastrointestinal:  Negative for abdominal pain, blood in stool, constipation, diarrhea, nausea and vomiting.   Musculoskeletal:  Negative for back pain.   Skin:         Scleral icterus   Neurological:  Negative for dizziness and weakness.   Hematological:  Does not bruise/bleed easily.   Objective:     Vital Signs (Most Recent):  Temp: 98.4 °F (36.9 °C) (07/05/22 0749)  Pulse: 63 (07/05/22 0749)  Resp: 18 (07/05/22 0749)  BP: (!) 148/82 (07/05/22 0749)  SpO2: 96 % (07/05/22 0749)   Vital Signs (24h Range):  Temp:  [97.4 °F (36.3 °C)-98.4 °F (36.9 °C)] 98.4 °F (36.9 °C)  Pulse:  [63-69] 63  Resp:  [12-20] 18  SpO2:  [96 %-100 %] 96 %  BP: (137-150)/(68-87) 148/82     Weight: 128.1 kg (282 lb 6.6 oz) (07/04/22 2321)  Body mass index is 38.3 kg/m².      Intake/Output Summary (Last 24 hours) at 7/5/2022 0927  Last data filed at 7/4/2022 1500  Gross per 24 hour   Intake 1050 ml   Output 675 ml   Net 375 ml       Lines/Drains/Airways       Peripheral Intravenous Line  Duration                  Peripheral IV - Single Lumen 07/03/22 1430 18 G Right Antecubital 1 day                    Physical Exam  Constitutional:       Appearance: He is not toxic-appearing.   HENT:      Head: Normocephalic and atraumatic.      Nose: Nose normal.      Mouth/Throat:      Mouth: Mucous membranes are moist.      Pharynx: Oropharynx is clear.   Eyes:      General: Scleral icterus present.      Extraocular Movements: Extraocular movements intact.   Cardiovascular:      Rate and Rhythm: Normal rate and regular rhythm.      Pulses: Normal pulses.   Pulmonary:      Effort: Pulmonary effort is  normal.   Abdominal:      General: Bowel sounds are normal. There is no distension.      Palpations: Abdomen is soft.      Tenderness: There is no abdominal tenderness.   Musculoskeletal:         General: No swelling.   Skin:     General: Skin is warm and dry.      Findings: No bruising.   Neurological:      General: No focal deficit present.      Mental Status: He is alert and oriented to person, place, and time. Mental status is at baseline.       Significant Labs:  Acute Hepatitis Panel:   Recent Labs   Lab 07/03/22  1959   HEPBSAG Negative   HEPAIGM Negative   HEPCAB Negative     CBC:   Recent Labs   Lab 07/03/22  1625 07/04/22  0502 07/05/22  0510   WBC 8.84 9.13 7.82   HGB 13.0* 12.4* 12.4*   HCT 37.1* 35.6* 35.4*    235 240     CMP:   Recent Labs   Lab 07/05/22  0510   GLU 91   CALCIUM 9.1   ALBUMIN 2.8*   PROT 7.2      K 3.0*   CO2 23      BUN 19   CREATININE 1.8*   ALKPHOS 177*   *   *   BILITOT 7.8*     Coagulation:   Recent Labs   Lab 07/05/22  0510   INR 1.2         Significant Imaging:  No new imaging today

## 2022-07-05 NOTE — PLAN OF CARE
Plan of care note  Contacted patients PCP in [facility] (Macylibrado Nichols,-[phone]) and spoke with medical records. They are faxing over patient records.                Gigi Becker MD  [facility] Internal Medicine Team B

## 2022-07-05 NOTE — PROGRESS NOTES
Transport here to take patient to MRI, Patient states he is claustrophobic. Notified MD of patient being claustrophobic and waiting for PRN medication to be ordered. Beryl from MRI called and states will not be able to do MRI due to delay. Notified MD.

## 2022-07-05 NOTE — HPI
This is a 46-year-old male with a history of hypertension who presented to the emergency department with a syncopal episode.  GI is now consulted for elevated LFTs.  Patient states he is in town on work and was taking double dose of his blood pressure medicine because he did not want to take it later on in the day.  He was then having a bowel movement and when he tried to get up he had a syncopal episode.  There was no preceding abdominal pain.  There is no nausea there is no vomiting.  There is no blood in his stool.  He currently denies any abdominal symptoms.  Never had anything like this before.  No prior history of liver disease.  No prior history of hepatitis.  No alcohol use.  No intravenous or otherwise drug use.

## 2022-07-05 NOTE — PLAN OF CARE
SW met with pt at bedside to complete assessment. Pt is AxO 3 and able to verbally answer assessment questions. Pt was able to confirm demographic information. Pt added his mother as his emergency contact but does not wish for staff to call and worry her at this point. Mother is Rosalba Fontana [phone]. Pt is originally from [place] and is in [place] for business. Pt is in town until July 18th. Pt is currently residing at the [address] [place] [address] and will return to this hot at time of d/c. Pt has requested a letter to excuse him from work. Pt reports having a PCP by the name of Macy Sung from [facility]. Pt is reported to be employed by [employer]. Pt has insurance through [employer]; SW received a copy for records. Pt reports to be independent with ADL's and requires no current DME use. Pt reports ability to afford medications. SW updated whiteboard with CM name and contact information. SW confirmed pt understanding of Observation unit and expected discharge plan. SW will continue to follow pt throughout care and assist with any discharge needs.         07/05/22 1151   Discharge Planning   Assessment Type Discharge Planning Brief Assessment   Resource/Environmental Concerns none   Support Systems Parent;Family members;Friends/neighbors   Equipment Currently Used at Home none   Current Living Arrangements home/apartment/condo   Patient/Family Anticipates Transition to home   Patient/Family Anticipated Services at Transition none   DME Needed Upon Discharge  none   Discharge Plan A Home with family     No future appointments. - Pt will have written d/c instructions for follow up in [place].     [facility] Case [facility]  [phone]

## 2022-07-06 LAB
ALBUMIN SERPL BCP-MCNC: 2.9 G/DL (ref 3.5–5.2)
ALP SERPL-CCNC: 168 U/L (ref 55–135)
ALT SERPL W/O P-5'-P-CCNC: 484 U/L (ref 10–44)
ANION GAP SERPL CALC-SCNC: 10 MMOL/L (ref 8–16)
AST SERPL-CCNC: 405 U/L (ref 10–40)
BASOPHILS # BLD AUTO: 0.09 K/UL (ref 0–0.2)
BASOPHILS NFR BLD: 1.2 % (ref 0–1.9)
BILIRUB SERPL-MCNC: 7.3 MG/DL (ref 0.1–1)
BUN SERPL-MCNC: 18 MG/DL (ref 6–20)
CALCIUM SERPL-MCNC: 8.9 MG/DL (ref 8.7–10.5)
CERULOPLASMIN SERPL-MCNC: 35 MG/DL (ref 15–45)
CHLORIDE SERPL-SCNC: 106 MMOL/L (ref 95–110)
CO2 SERPL-SCNC: 25 MMOL/L (ref 23–29)
CREAT SERPL-MCNC: 1.6 MG/DL (ref 0.5–1.4)
DIFFERENTIAL METHOD: ABNORMAL
EOSINOPHIL # BLD AUTO: 0.2 K/UL (ref 0–0.5)
EOSINOPHIL NFR BLD: 2.6 % (ref 0–8)
ERYTHROCYTE [DISTWIDTH] IN BLOOD BY AUTOMATED COUNT: 21.2 % (ref 11.5–14.5)
EST. GFR  (AFRICAN AMERICAN): 60 ML/MIN/1.73 M^2
EST. GFR  (NON AFRICAN AMERICAN): 52 ML/MIN/1.73 M^2
FERRITIN SERPL-MCNC: 2008 NG/ML (ref 20–300)
GLUCOSE SERPL-MCNC: 87 MG/DL (ref 70–110)
HCT VFR BLD AUTO: 35.4 % (ref 40–54)
HGB BLD-MCNC: 12 G/DL (ref 14–18)
IMM GRANULOCYTES # BLD AUTO: 0.01 K/UL (ref 0–0.04)
IMM GRANULOCYTES NFR BLD AUTO: 0.1 % (ref 0–0.5)
IRON SERPL-MCNC: 253 UG/DL (ref 45–160)
LYMPHOCYTES # BLD AUTO: 3.4 K/UL (ref 1–4.8)
LYMPHOCYTES NFR BLD: 46.2 % (ref 18–48)
MAGNESIUM SERPL-MCNC: 2 MG/DL (ref 1.6–2.6)
MCH RBC QN AUTO: 28 PG (ref 27–31)
MCHC RBC AUTO-ENTMCNC: 33.9 G/DL (ref 32–36)
MCV RBC AUTO: 83 FL (ref 82–98)
MONOCYTES # BLD AUTO: 0.9 K/UL (ref 0.3–1)
MONOCYTES NFR BLD: 12.4 % (ref 4–15)
NEUTROPHILS # BLD AUTO: 2.8 K/UL (ref 1.8–7.7)
NEUTROPHILS NFR BLD: 37.5 % (ref 38–73)
NRBC BLD-RTO: 0 /100 WBC
PHOSPHATE SERPL-MCNC: 3.4 MG/DL (ref 2.7–4.5)
PLATELET # BLD AUTO: 254 K/UL (ref 150–450)
PMV BLD AUTO: 11.1 FL (ref 9.2–12.9)
POTASSIUM SERPL-SCNC: 3.6 MMOL/L (ref 3.5–5.1)
PROT SERPL-MCNC: 7 G/DL (ref 6–8.4)
RBC # BLD AUTO: 4.28 M/UL (ref 4.6–6.2)
SATURATED IRON: 84 % (ref 20–50)
SODIUM SERPL-SCNC: 141 MMOL/L (ref 136–145)
TOTAL IRON BINDING CAPACITY: 300 UG/DL (ref 250–450)
TRANSFERRIN SERPL-MCNC: 203 MG/DL (ref 200–375)
TRANSFERRIN SERPL-MCNC: 203 MG/DL (ref 200–375)
WBC # BLD AUTO: 7.43 K/UL (ref 3.9–12.7)

## 2022-07-06 PROCEDURE — 82390 ASSAY OF CERULOPLASMIN: CPT | Performed by: STUDENT IN AN ORGANIZED HEALTH CARE EDUCATION/TRAINING PROGRAM

## 2022-07-06 PROCEDURE — 85025 COMPLETE CBC W/AUTO DIFF WBC: CPT | Performed by: STUDENT IN AN ORGANIZED HEALTH CARE EDUCATION/TRAINING PROGRAM

## 2022-07-06 PROCEDURE — 86255 FLUORESCENT ANTIBODY SCREEN: CPT | Performed by: STUDENT IN AN ORGANIZED HEALTH CARE EDUCATION/TRAINING PROGRAM

## 2022-07-06 PROCEDURE — 99223 PR INITIAL HOSPITAL CARE,LEVL III: ICD-10-PCS | Mod: ,,, | Performed by: PSYCHIATRY & NEUROLOGY

## 2022-07-06 PROCEDURE — 99232 SBSQ HOSP IP/OBS MODERATE 35: CPT | Mod: ,,, | Performed by: INTERNAL MEDICINE

## 2022-07-06 PROCEDURE — 99223 1ST HOSP IP/OBS HIGH 75: CPT | Mod: ,,, | Performed by: PSYCHIATRY & NEUROLOGY

## 2022-07-06 PROCEDURE — 86038 ANTINUCLEAR ANTIBODIES: CPT | Performed by: STUDENT IN AN ORGANIZED HEALTH CARE EDUCATION/TRAINING PROGRAM

## 2022-07-06 PROCEDURE — 84466 ASSAY OF TRANSFERRIN: CPT | Performed by: STUDENT IN AN ORGANIZED HEALTH CARE EDUCATION/TRAINING PROGRAM

## 2022-07-06 PROCEDURE — 99232 PR SUBSEQUENT HOSPITAL CARE,LEVL II: ICD-10-PCS | Mod: ,,, | Performed by: INTERNAL MEDICINE

## 2022-07-06 PROCEDURE — 63600175 PHARM REV CODE 636 W HCPCS: Performed by: STUDENT IN AN ORGANIZED HEALTH CARE EDUCATION/TRAINING PROGRAM

## 2022-07-06 PROCEDURE — 86235 NUCLEAR ANTIGEN ANTIBODY: CPT | Performed by: STUDENT IN AN ORGANIZED HEALTH CARE EDUCATION/TRAINING PROGRAM

## 2022-07-06 PROCEDURE — 83735 ASSAY OF MAGNESIUM: CPT | Performed by: STUDENT IN AN ORGANIZED HEALTH CARE EDUCATION/TRAINING PROGRAM

## 2022-07-06 PROCEDURE — 36415 COLL VENOUS BLD VENIPUNCTURE: CPT | Performed by: STUDENT IN AN ORGANIZED HEALTH CARE EDUCATION/TRAINING PROGRAM

## 2022-07-06 PROCEDURE — 80053 COMPREHEN METABOLIC PANEL: CPT | Performed by: STUDENT IN AN ORGANIZED HEALTH CARE EDUCATION/TRAINING PROGRAM

## 2022-07-06 PROCEDURE — 25000003 PHARM REV CODE 250: Performed by: STUDENT IN AN ORGANIZED HEALTH CARE EDUCATION/TRAINING PROGRAM

## 2022-07-06 PROCEDURE — 11000001 HC ACUTE MED/SURG PRIVATE ROOM

## 2022-07-06 PROCEDURE — 82728 ASSAY OF FERRITIN: CPT | Performed by: STUDENT IN AN ORGANIZED HEALTH CARE EDUCATION/TRAINING PROGRAM

## 2022-07-06 PROCEDURE — 25000003 PHARM REV CODE 250

## 2022-07-06 PROCEDURE — 84100 ASSAY OF PHOSPHORUS: CPT | Performed by: STUDENT IN AN ORGANIZED HEALTH CARE EDUCATION/TRAINING PROGRAM

## 2022-07-06 RX ORDER — CARVEDILOL 12.5 MG/1
12.5 TABLET ORAL 2 TIMES DAILY
Status: DISCONTINUED | OUTPATIENT
Start: 2022-07-06 | End: 2022-07-10

## 2022-07-06 RX ADMIN — AMLODIPINE BESYLATE 10 MG: 5 TABLET ORAL at 09:07

## 2022-07-06 RX ADMIN — LAMOTRIGINE 50 MG: 25 TABLET ORAL at 09:07

## 2022-07-06 RX ADMIN — HEPARIN SODIUM 5000 UNITS: 5000 INJECTION INTRAVENOUS; SUBCUTANEOUS at 08:07

## 2022-07-06 RX ADMIN — HEPARIN SODIUM 5000 UNITS: 5000 INJECTION INTRAVENOUS; SUBCUTANEOUS at 05:07

## 2022-07-06 RX ADMIN — CARVEDILOL 12.5 MG: 12.5 TABLET, FILM COATED ORAL at 09:07

## 2022-07-06 RX ADMIN — HEPARIN SODIUM 5000 UNITS: 5000 INJECTION INTRAVENOUS; SUBCUTANEOUS at 01:07

## 2022-07-06 RX ADMIN — CARVEDILOL 12.5 MG: 12.5 TABLET, FILM COATED ORAL at 08:07

## 2022-07-06 NOTE — PROGRESS NOTES
[facility] Medicine Progress Note    Primary Team: [facility] Hospitalist Team A  Attending Physician: Greg Pack MD  Resident: Ebony  Intern: [facility] Day: 1     Chief Complaint:  near syncopal episode (Patient was at work and admits that he may have taken too much of his BP medication. Patient became dizzy and near fainted. BP was 90's palpable and improved after fluid bolus. EMS started iv access and gave fluid bolus of 150. Denies pain at present time. [place])      Subjective:      Patient seen and examined by me this morning. No complaints overnight and no complaints this morning.     Review of Systems   Constitutional: Negative for chills and fever.   HENT: Negative for ear pain and hearing loss.    Eyes: Negative for blurred vision and double vision.   Respiratory: Negative for cough and shortness of breath.    Cardiovascular: Negative for chest pain, palpitations and leg swelling.   Gastrointestinal: Negative for abdominal pain, constipation, diarrhea, heartburn, nausea and vomiting.   Genitourinary: Negative for dysuria, frequency and urgency.   Musculoskeletal: Negative for falls and myalgias.   Skin: Negative for itching and rash.   Neurological: Negative for dizziness and headaches.   Endo/Heme/Allergies: Negative for polydipsia. Does not bruise/bleed easily.   Psychiatric/Behavioral: Negative for depression and memory loss. The patient is not nervous/anxious and does not have insomnia.       No past medical history on file.           Objective:   Last 24 Hour Vital Signs:  BP  Min: 130/73  Max: 179/99  Temp  Av.4 °F (36.9 °C)  Min: 98.1 °F (36.7 °C)  Max: 98.7 °F (37.1 °C)  Pulse  Av.7  Min: 61  Max: 75  Resp  Av.5  Min: 17  Max: 20  SpO2  Av %  Min: 96 %  Max: 100 %  Weight  Av.6 kg (279 lb 1.6 oz)  Min: 126.6 kg (279 lb 1.6 oz)  Max: 126.6 kg (279 lb 1.6 oz)    Intake/Output Summary (Last 24 hours) at 2022 0747  Last data filed at 2022  0500  Gross per 24 hour   Intake 240 ml   Output 1600 ml   Net -1360 ml      Physical Examination:  Physical Exam  Vitals reviewed.   Constitutional:       Appearance: Normal appearance. He is normal weight.   HENT:      Head: Normocephalic and atraumatic.      Right Ear: External ear normal.      Left Ear: External ear normal.      Nose: Nose normal.      Mouth/Throat:      Mouth: Mucous membranes are moist.   Eyes:      General: Scleral icterus present.      Extraocular Movements: Extraocular movements intact.      Pupils: Pupils are equal, round, and reactive to light.   Cardiovascular:      Rate and Rhythm: Normal rate and regular rhythm.      Pulses: Normal pulses.      Heart sounds: Normal heart sounds. No murmur heard.  Pulmonary:      Effort: Pulmonary effort is normal. No respiratory distress.      Breath sounds: Normal breath sounds. No wheezing or rales.   Abdominal:      General: Abdomen is flat. Bowel sounds are normal. There is no distension.      Palpations: Abdomen is soft.      Tenderness: There is no abdominal tenderness.   Skin:     General: Skin is warm and dry.      Capillary Refill: Capillary refill takes less than 2 seconds.   Neurological:      General: No focal deficit present.      Mental Status: He is alert and oriented to person, place, and time. Mental status is at baseline.   Psychiatric:         Mood and Affect: Mood normal.         Behavior: Behavior normal.         Thought Content: Thought content normal.        Laboratory:  Recent Labs   Lab 07/03/22  1625 07/04/22  0502 07/05/22  0510 07/06/22  0457   WBC 8.84 9.13 7.82 7.43   HGB 13.0* 12.4* 12.4* 12.0*   HCT 37.1* 35.6* 35.4* 35.4*    235 240 254   MCV 82 83 82 83   RDW 21.7* 21.6* 21.7* 21.2*    144 139 141   K 3.0* 3.0* 3.0* 3.6    106 103 106   CO2 25 26 23 25   BUN 16 15 19 18   CREATININE 2.1* 1.7* 1.8* 1.6*    104 91 87   PROT 7.8 7.0 7.2 7.0   ALBUMIN 3.2* 2.8* 2.8* 2.9*   BILITOT 6.9* 7.0* 7.8*  7.3*   * 261* 414* 405*   ALKPHOS 190* 183* 177* 168*   * 411* 481* 484*     Urinalysis  No results for input(s): COLORU, CLARITYU, SPECGRAV, PHUR, PROTEINUA, GLUCOSEU, BILIRUBINCON, BLOODU, WBCU, RBCU, BACTERIA, MUCUS, NITRITE, LEUKOCYTESUR, UROBILINOGEN, HYALINECASTS in the last 24 hours.    Microbiology Results (last 7 days)     ** No results found for the last 168 hours. **           I have personally reviewed the above laboratory studies.     Imaging:  EKG (my interpretation): no new    MRI MRCP Without Contrast   Final Result      No biliary dilatation or evidence for choledocholithiasis.  Further assessment with ERCP as warranted.      Additional findings as above.         Electronically signed by: Gene Guardado   Date:    07/05/2022   Time:    12:00      US Abdomen Complete   Final Result      Nonspecific gallbladder wall thickening which may be reactive or may be accentuated by gallbladder contraction.  No cholelithiasis or sonographic evidence of acute cholecystitis.      Hepatomegaly.         Electronically signed by: Kit Johnson   Date:    07/03/2022   Time:    22:20      X-Ray Chest AP Portable   Final Result      No radiographic acute intrathoracic process seen on this single view.         Electronically signed by: Gentry Grant MD   Date:    07/03/2022   Time:    16:23      US Liver with Doppler (xpd)    (Results Pending)       Current Medications:     Scheduled:   amLODIPine  10 mg Oral Daily    heparin (porcine)  5,000 Units Subcutaneous Q8H    lamoTRIgine  50 mg Oral BID         Infusions:       PRN:  naloxone, sodium chloride 0.9%  Antibiotics and Day Number of Therapy:  Antibiotics (From admission, onward)            None             Assessment:     Niecy Bolivar is a 46 y.o. male with a PMHx of HTN who [place] [facility] [place]iness w/ near syncope for one day. Found to have been taking double his hydralazine dose for one month. Also found to have JOÃO with elevated Cr,  transaminitis, and hyperbilirubinemia. Hypokalemia also present. No history of renal disease or hepatic disease. No history of EtOH use. IV fluids given by ED with transfer to [facility] for further work up and management of JOÃO, hyperbilirubinemia, elevated liver function labs. Renal function improved on 07/04 with continued up trend in Tbilli, AST, ALT. Hepatitis Panels still in progress. GI consulted and recommend repeat bilirubin as they suspect increase due to ischemia and that levels will begin to downtrend.Tbilli and ALT, AST continued to uptrend on 07/05 so MRCP performed with no acute findings. Hypokalemia resolved with K repletion and Mg. Liver US with doppler today with no acute findings. GI rec discontinuing lamictal and possible liver biopsy due to unremarkable findings on liver imaging.    Plan:     JOÃO   -Cr on admit 2.1, baseline unknown  -likely 2/2 to low volume status due to accidental over use of home antihypertensives compounded by sweating outside in heat  -Cr improved to 1.7 on 07/04 with IV fluids, 1.6 on 07/06  -Continue to replete  -Avoid nephrotoxic drugs, contrast        Near Syncope/ Dizziness  -Pt hypotensive per EMS to 90's upon their arrival  -Improved with fluid bolus  -No complaints of dizziness/fainting at this time  -Likely 2/2 to taking double hydralazine dose and compounded by hot weather  -CTM      Hyperbilirubinemia  -Tbilli 6.9 on admit  -Direct Bilirubin elevated at 5.1  -Tbilli trending upward at 7 (07/04) and 7.8 (07/05)  -Tbilli trending down this AM at 7.3   -GI consulted and recommended trending Tbilli with MRCP suggested if continued uptrend  -Abdominal US with gall bladder wall thickening, enlarged liver  -Daily CMP  -MRCP performed with no acute findings, ativan 1mg administered for claustrophobia  -Liver US this morning with no findings  -Possible biopsy  -Trend     Elevated Liver Enzymes  #transaminitis (worsening)  #elevated Alk phos (improving)  -, ALT  504, Alk phos 190 on admit  -Hepatitis Panel in progress  -Improved to , , Alk phos 183 on 07/04  -Worsened again to ,  on 07/05 and remained at near same levels on 07/06  -MRCP ordered per GI recs, no acute findings  -Liver US today with no findings  -Biopsy possible      Hypokalemia/Hypomag  -K 3, Mg 1.7 on admit  -S/p K 50meq on 07/03, s/p K 50 meq on 07/04  -S/p Mg 2g on 07/04  -No change in K overnight on 06/03, remains at 3 morning of 06/04  -This AM again no change in K overnight with levels remaining at 3  -CTM and will replete PRN  -Mg 1.9 on 07/05, Mg 2g ordered   -Hypokalemia resolved this AM with K 3.6        Essential HTN  -patient on chlorthalidone 25 mg daily, amlodipine 10mg daily, carvedilol 12.5mg BID, hydralazine 100mg (0.5 tablets) TID  -Patient reports doubling hydralazine dose for last month  -Amlodipine resumed and will titrate PRN  -Pressures elevated overnight 170's/  -Coreg restarted        Seizure Disorder  -Seizure free for years  -Stop home Lamictal 50mg in setting of suspected hepatic injury             Diet: Diet NPO   DVT Prophylaxis: Hep  Code: Full Code     Dispo: pending resolution of elevated liver enzymes and US liver    Gigi Becker M.D.  [facility] Neurology PGY-1    [facility] Hospitalist Pager numbers:   [facility] Hospitalist Medicine Team A (Sirena/Laura): [phone]  [facility] Hospitalist Medicine Team B (Rober/Preston):  [phone]

## 2022-07-06 NOTE — SUBJECTIVE & OBJECTIVE
Subjective:     Interval History: Mr. Bolivar reports no issues overnight and is awaiting ultrasound this morning. No abdominal pain at this time.    Review of Systems   Constitutional:  Negative for appetite change, chills and fever.   Gastrointestinal:  Negative for abdominal distention, abdominal pain, constipation, diarrhea, nausea and vomiting.   Skin:  Negative for color change and rash.   Neurological:  Negative for dizziness and weakness.   Objective:     Vital Signs (Most Recent):  Temp: 97.3 °F (36.3 °C) (07/06/22 0748)  Pulse: 64 (07/06/22 0748)  Resp: 19 (07/06/22 0748)  BP: 136/79 (07/06/22 0748)  SpO2: 98 % (07/06/22 0748) Vital Signs (24h Range):  Temp:  [97.3 °F (36.3 °C)-98.7 °F (37.1 °C)] 97.3 °F (36.3 °C)  Pulse:  [61-75] 64  Resp:  [17-20] 19  SpO2:  [97 %-100 %] 98 %  BP: (130-179)/() 136/79     Weight: 126.6 kg (279 lb 1.6 oz) (07/06/22 0500)  Body mass index is 37.85 kg/m².      Intake/Output Summary (Last 24 hours) at 7/6/2022 0826  Last data filed at 7/6/2022 0752  Gross per 24 hour   Intake 120 ml   Output 1900 ml   Net -1780 ml       Lines/Drains/Airways       Peripheral Intravenous Line  Duration                  Peripheral IV - Single Lumen 07/03/22 1430 18 G Right Antecubital 2 days                    Physical Exam  Constitutional:       Appearance: Normal appearance.   HENT:      Head: Normocephalic and atraumatic.   Eyes:      Extraocular Movements: Extraocular movements intact.      Conjunctiva/sclera: Conjunctivae normal.   Cardiovascular:      Rate and Rhythm: Normal rate and regular rhythm.   Pulmonary:      Effort: Pulmonary effort is normal.   Abdominal:      General: Abdomen is flat. Bowel sounds are normal. There is no distension.      Palpations: Abdomen is soft.      Tenderness: There is no abdominal tenderness.   Skin:     Coloration: Skin is not jaundiced.      Findings: No bruising.   Neurological:      General: No focal deficit present.      Mental Status: He is  FYI- pt's BP was 147/86. Denies s/sx of Pre-E. Given Labetalol 400mg. Reports her R arm numbness/tingling and shakiness is improving.   alert.       Significant Labs:  CBC:   Recent Labs   Lab 07/05/22  0510 07/06/22  0457   WBC 7.82 7.43   HGB 12.4* 12.0*   HCT 35.4* 35.4*    254     CMP:   Recent Labs   Lab 07/06/22  0457   GLU 87   CALCIUM 8.9   ALBUMIN 2.9*   PROT 7.0      K 3.6   CO2 25      BUN 18   CREATININE 1.6*   ALKPHOS 168*   *   *   BILITOT 7.3*     Coagulation:   Recent Labs   Lab 07/05/22  0510   INR 1.2         Significant Imaging:  MRCP: I have reviewed all results within the past 24 hours and my personal findings are:  No abnormalities noted.

## 2022-07-06 NOTE — ASSESSMENT & PLAN NOTE
Mr. Niecy Bolivar is a 46 year old male with elevated liver enzymes presumed secondary to ischemic event after taking multiple hydralazine leading to a syncopal episode. LDH elevated and T bili remains elevated. Abdominal ultrasound with duct size of 6 mm (expected to be ~4 mm based on age), however, MRCP normal.     Recommendations:  - Continue to trend LFT's  - Plan for ultrasound with dopplers today to assess for clot  - May ultimately need liver biopsy if imaging remains unrevealing and enzymes remain elevated  - Supportive care

## 2022-07-06 NOTE — PLAN OF CARE
chart reviewed - case discussed in am MD meeting   Future Appointments   Date Time Provider Department Center   7/15/2022  9:00 AM Kerry Umanzor MD [facility]     JOÃO resolved;  elevated LFT's   DC in 1 to 2 days.         07/06/22 1506   Post-Acute Status   Post-Acute Authorization Other   Other Status No Post-Acute Service Needs   Hospital Resources/Appts/Education Provided Appointments scheduled and added to AVS   Discharge Delays None known at this time   Discharge Plan   Discharge Plan A Home

## 2022-07-06 NOTE — PLAN OF CARE
AAOx4. No complaints of pain. NPO since midnight for US liver today. Voiding per urinal. Bed locked in lowest position, bed alarm set and call bell within reach.

## 2022-07-06 NOTE — CONSULTS
NEUROLOGY FLOOR CONSULT    Reason for consult:  Anticonvulsant therapy managment    HPI:   Niecy Bolivar is a 46 y.o. year old male with PMHx of HTN, dyslipidemia, seizure-disorder (with no known event in last 15 years.) Patient was admitted to 7/3/22 following a near-syncopal event upon standing. Patient experienced dizziness w/o nausea or vomiting. Denies head trauma, LOC.   Patient endorses taking a double-dose of hydralazine Sun morning rather than am/pm dosing. Patient denies other changes to medications.   Due to elevated liver enzymes (, ) patient's lamotragine has been discontinued.  Currently, patient has no complaints.    Histories:     Allergies:  Patient has no known allergies.    Current Medications:    Current Facility-Administered Medications   Medication Dose Route Frequency Provider Last Rate Last Admin    amLODIPine tablet 10 mg  10 mg Oral Daily Anam Beck MD   10 mg at 07/06/22 0939    carvediloL tablet 12.5 mg  12.5 mg Oral BID Gigi Becker MD   12.5 mg at 07/06/22 0939    heparin (porcine) injection 5,000 Units  5,000 Units Subcutaneous Q8H Anam Beck MD   5,000 Units at 07/06/22 1328    naloxone 0.4 mg/mL injection 0.02 mg  0.02 mg Intravenous PRN Anam Beck MD        sodium chloride 0.9% flush 10 mL  10 mL Intravenous Q12H PRN Anam Beck MD             Social History:    Occupational/Employment History:  Aviation     Current Evaluation:     Vital Signs:   Vitals:    07/06/22 1116   BP: 134/78   Pulse: 72   Resp: 18   Temp: 97.6 °F (36.4 °C)        Neurological Examination   Orientation  Alert, awake, oriented to self, place, time, and situation.  Memory  Recent and remote memory intact.  Language  No dysarthria, No aphasia.   Cranial Nerves  PERRL, VF intact, EOMI, V1-V3 intact, symmetric facial expression, hearing grossly intact, SCM & TPZ 5/5, tongue midline.  Motor  Normal Bulk  Normal Tone  5/5 strength in 4  extremities  Sensory  Normal to light touch throughout    DTR    Cerebellar/Gait      RADIOLOGY STUDIES:      Assessment:  RAFA Bolivar is a 46 y.o. year old male with PMHx of HTN, dyslipidemia, seizure-disorder (with no known event in last 15 years.)    Impression:  1. Patient's seizure disorder has been well-controlled for 15 years. Lamotragine was discontinued. GFR was checked and can support a switch to levetiracetam.     Treatment Plan    Start levetiracetam 2000mg loading dose, followed by 1000mg bid.     Differential diagnosis was explained to the patient. All questions were answered. Patient understood and agreed to adhere to plan.       Case discussed with Dr. Leon      Will follow for additional input regarding management of current neurologic condition and monitor for any new signs/symptoms to suggest neurologic detrimental changes.     Appreciate the consult.     signature: David Hirsch MD     Family Medicine PGY-1

## 2022-07-06 NOTE — PLAN OF CARE
Pt is AAOx4. Pt given medications as ordered per MAR. Pt to be NPO at midnight for IR liver biopsy. Pt ambulating independently. Urinal provided and within reach. No c/o pain, n/v, or sob. Safety maintained. Instructed to use call light for assistance. Will continue to monitor.         Problem: Adult Inpatient Plan of Care  Goal: Optimal Comfort and Wellbeing  Outcome: Ongoing, Progressing     Problem: Renal Function Impairment (Acute Kidney Injury/Impairment)  Goal: Effective Renal Function  Outcome: Ongoing, Progressing     Problem: Fall Injury Risk  Goal: Absence of Fall and Fall-Related Injury  Outcome: Ongoing, Progressing

## 2022-07-06 NOTE — PROGRESS NOTES
[facility]  Gastroenterology  Progress Note    Patient Name: Niecy Bolivar  MRN: [id number]  Admission Date: 7/3/2022  Hospital Length of Stay: 1 days  Code Status: Full Code   Attending Provider: Greg Pack MD  Consulting Provider: Deborah Sanchez MD  Primary Care Physician: No primary care provider on file.  Princial Problem: JOÃO (acute kidney injury)       Subjective:     Interval History: Mr. Bolivar reports no issues overnight and is awaiting ultrasound this morning. No abdominal pain at this time.     Patient reports no history of new herbal supplementation or medications. Denies any recent antibiotics within the past 3 months. He reports no family history of liver or lung disease. He denies any prior liver disease.    Review of Systems   Constitutional:  Negative for appetite change, chills and fever.   Gastrointestinal:  Negative for abdominal distention, abdominal pain, constipation, diarrhea, nausea and vomiting.   Skin:  Negative for color change and rash.   Neurological:  Negative for dizziness and weakness.     Objective:     Vital Signs (Most Recent):  Temp: 97.3 °F (36.3 °C) (07/06/22 0748)  Pulse: 64 (07/06/22 0748)  Resp: 19 (07/06/22 0748)  BP: 136/79 (07/06/22 0748)  SpO2: 98 % (07/06/22 0748) Vital Signs (24h Range):  Temp:  [97.3 °F (36.3 °C)-98.7 °F (37.1 °C)] 97.3 °F (36.3 °C)  Pulse:  [61-75] 64  Resp:  [17-20] 19  SpO2:  [97 %-100 %] 98 %  BP: (130-179)/() 136/79     Weight: 126.6 kg (279 lb 1.6 oz) (07/06/22 0500)  Body mass index is 37.85 kg/m².      Intake/Output Summary (Last 24 hours) at 7/6/2022 0826  Last data filed at 7/6/2022 0752  Gross per 24 hour   Intake 120 ml   Output 1900 ml   Net -1780 ml       Lines/Drains/Airways       Peripheral Intravenous Line  Duration                  Peripheral IV - Single Lumen 07/03/22 1430 18 G Right Antecubital 2 days                    Physical Exam  Constitutional:       Appearance: Normal appearance.   HENT:      Head:  Normocephalic and atraumatic.   Eyes:      Extraocular Movements: Extraocular movements intact.      Conjunctiva/sclera: Conjunctivae normal.   Cardiovascular:      Rate and Rhythm: Normal rate and regular rhythm.   Pulmonary:      Effort: Pulmonary effort is normal.   Abdominal:      General: Abdomen is flat. Bowel sounds are normal. There is no distension.      Palpations: Abdomen is soft.      Tenderness: There is no abdominal tenderness.   Skin:     Coloration: Skin is not jaundiced.      Findings: No bruising.   Neurological:      General: No focal deficit present.      Mental Status: He is alert.       Significant Labs:  CBC:   Recent Labs   Lab 07/05/22 0510 07/06/22 0457   WBC 7.82 7.43   HGB 12.4* 12.0*   HCT 35.4* 35.4*    254     CMP:   Recent Labs   Lab 07/06/22 0457   GLU 87   CALCIUM 8.9   ALBUMIN 2.9*   PROT 7.0      K 3.6   CO2 25      BUN 18   CREATININE 1.6*   ALKPHOS 168*   *   *   BILITOT 7.3*     Coagulation:   Recent Labs   Lab 07/05/22 0510   INR 1.2         Significant Imaging:  MRCP: I have reviewed all results within the past 24 hours and my personal findings are:  No abnormalities noted.    Assessment/Plan:     Elevated liver enzymes  Mr. Niecy Bolivar is a 46 year old male with elevated liver enzymes presumed secondary to ischemic event after taking multiple hydralazine leading to a syncopal episode. LDH elevated (supports ischemic etiology). Hepatitis panel negative. Enzymes and T. Bili with small downtrend today. Abdominal ultrasound with common bile duct size of 6 mm (expected to be ~4 mm based on age), however, MRCP normal without evidence of stone or biliary dilatation. Ultrasound with doppler without signs of clot. DILI could potentially be cause as both hydralazine and Lamictal are well established causes of liver injury; however, Lamictal is typically within 2-6 weeks of initial start of drug and patient has been on medication for 15+ years.  Hydralazine injury is typically resolved within a few days of cessation of the medication.    Recommendations:  - Liver biopsy is the next step as imaging has been unrevealing and patient remains with elevated enzymes  - May consider obtaining further hepatocellular injury workup (iron, transferrin sat, ferritin, ceruloplasmin level)  - Continue to trend LFT's  - Supportive care    Thank you for your consult. I will follow-up with patient. Please contact us if you have any additional questions.    Deborah Sanchez MD  Gastroenterology  [facility]

## 2022-07-07 LAB
ALBUMIN SERPL BCP-MCNC: 3 G/DL (ref 3.5–5.2)
ALP SERPL-CCNC: 194 U/L (ref 55–135)
ALT SERPL W/O P-5'-P-CCNC: 490 U/L (ref 10–44)
ANA SER QL IF: NORMAL
ANION GAP SERPL CALC-SCNC: 10 MMOL/L (ref 8–16)
AORTIC ROOT ANNULUS: 3.55 CM
AST SERPL-CCNC: 397 U/L (ref 10–40)
AV INDEX (PROSTH): 0.6
AV MEAN GRADIENT: 6 MMHG
AV PEAK GRADIENT: 10 MMHG
AV VALVE AREA: 3.01 CM2
AV VELOCITY RATIO: 0.6
BASOPHILS # BLD AUTO: 0.11 K/UL (ref 0–0.2)
BASOPHILS NFR BLD: 1.6 % (ref 0–1.9)
BILIRUB SERPL-MCNC: 6.7 MG/DL (ref 0.1–1)
BSA FOR ECHO PROCEDURE: 2.51 M2
BUN SERPL-MCNC: 21 MG/DL (ref 6–20)
CALCIUM SERPL-MCNC: 9.4 MG/DL (ref 8.7–10.5)
CHLORIDE SERPL-SCNC: 103 MMOL/L (ref 95–110)
CO2 SERPL-SCNC: 24 MMOL/L (ref 23–29)
CREAT SERPL-MCNC: 2.1 MG/DL (ref 0.5–1.4)
CV ECHO LV RWT: 0.57 CM
DIFFERENTIAL METHOD: ABNORMAL
DOP CALC AO PEAK VEL: 1.6 M/S
DOP CALC AO VTI: 29.77 CM
DOP CALC LVOT AREA: 5 CM2
DOP CALC LVOT DIAMETER: 2.53 CM
DOP CALC LVOT PEAK VEL: 0.96 M/S
DOP CALC LVOT STROKE VOLUME: 89.74 CM3
DOP CALC MV VTI: 21.28 CM
DOP CALCLVOT PEAK VEL VTI: 17.86 CM
E WAVE DECELERATION TIME: 323.15 MSEC
E/A RATIO: 1.1
E/E' RATIO: 9.17 M/S
ECHO LV POSTERIOR WALL: 1.62 CM (ref 0.6–1.1)
EJECTION FRACTION: 60 %
EOSINOPHIL # BLD AUTO: 0.2 K/UL (ref 0–0.5)
EOSINOPHIL NFR BLD: 2.9 % (ref 0–8)
ERYTHROCYTE [DISTWIDTH] IN BLOOD BY AUTOMATED COUNT: 21.6 % (ref 11.5–14.5)
EST. GFR  (AFRICAN AMERICAN): 43 ML/MIN/1.73 M^2
EST. GFR  (NON AFRICAN AMERICAN): 37 ML/MIN/1.73 M^2
FRACTIONAL SHORTENING: 32 % (ref 28–44)
GLUCOSE SERPL-MCNC: 108 MG/DL (ref 70–110)
HCT VFR BLD AUTO: 36.8 % (ref 40–54)
HGB BLD-MCNC: 13 G/DL (ref 14–18)
IMM GRANULOCYTES # BLD AUTO: 0.02 K/UL (ref 0–0.04)
IMM GRANULOCYTES NFR BLD AUTO: 0.3 % (ref 0–0.5)
INR PPP: 1.2 (ref 0.8–1.2)
INTERVENTRICULAR SEPTUM: 1.57 CM (ref 0.6–1.1)
LA MAJOR: 6.24 CM
LA MINOR: 5.28 CM
LA WIDTH: 4.73 CM
LEFT ATRIUM SIZE: 4.14 CM
LEFT ATRIUM VOLUME INDEX MOD: 31.2 ML/M2
LEFT ATRIUM VOLUME INDEX: 39.2 ML/M2
LEFT ATRIUM VOLUME MOD: 75.87 CM3
LEFT ATRIUM VOLUME: 95.21 CM3
LEFT INTERNAL DIMENSION IN SYSTOLE: 3.83 CM (ref 2.1–4)
LEFT VENTRICLE DIASTOLIC VOLUME INDEX: 64.75 ML/M2
LEFT VENTRICLE DIASTOLIC VOLUME: 157.35 ML
LEFT VENTRICLE MASS INDEX: 175 G/M2
LEFT VENTRICLE SYSTOLIC VOLUME INDEX: 25.9 ML/M2
LEFT VENTRICLE SYSTOLIC VOLUME: 62.94 ML
LEFT VENTRICULAR INTERNAL DIMENSION IN DIASTOLE: 5.66 CM (ref 3.5–6)
LEFT VENTRICULAR MASS: 426.44 G
LV LATERAL E/E' RATIO: 7.86 M/S
LV SEPTAL E/E' RATIO: 11 M/S
LYMPHOCYTES # BLD AUTO: 3 K/UL (ref 1–4.8)
LYMPHOCYTES NFR BLD: 43.3 % (ref 18–48)
MAGNESIUM SERPL-MCNC: 1.8 MG/DL (ref 1.6–2.6)
MCH RBC QN AUTO: 28.8 PG (ref 27–31)
MCHC RBC AUTO-ENTMCNC: 35.3 G/DL (ref 32–36)
MCV RBC AUTO: 81 FL (ref 82–98)
MONOCYTES # BLD AUTO: 0.8 K/UL (ref 0.3–1)
MONOCYTES NFR BLD: 11.4 % (ref 4–15)
MV A" WAVE DURATION": 8.37 MSEC
MV MEAN GRADIENT: 0 MMHG
MV PEAK A VEL: 0.5 M/S
MV PEAK E VEL: 0.55 M/S
MV PEAK GRADIENT: 1 MMHG
MV STENOSIS PRESSURE HALF TIME: 93.71 MS
MV VALVE AREA BY CONTINUITY EQUATION: 4.22 CM2
MV VALVE AREA P 1/2 METHOD: 2.35 CM2
NEUTROPHILS # BLD AUTO: 2.8 K/UL (ref 1.8–7.7)
NEUTROPHILS NFR BLD: 40.5 % (ref 38–73)
NRBC BLD-RTO: 0 /100 WBC
PHOSPHATE SERPL-MCNC: 3.8 MG/DL (ref 2.7–4.5)
PLATELET # BLD AUTO: 255 K/UL (ref 150–450)
PMV BLD AUTO: 10.8 FL (ref 9.2–12.9)
POTASSIUM SERPL-SCNC: 3 MMOL/L (ref 3.5–5.1)
PROT SERPL-MCNC: 7.7 G/DL (ref 6–8.4)
PROTHROMBIN TIME: 11.9 SEC (ref 9–12.5)
PULM VEIN S/D RATIO: 1.29
PV PEAK D VEL: 0.35 M/S
PV PEAK S VEL: 0.45 M/S
PV PEAK VELOCITY: 1.22 CM/S
RA MAJOR: 5.5 CM
RA WIDTH: 3.66 CM
RBC # BLD AUTO: 4.52 M/UL (ref 4.6–6.2)
RIGHT VENTRICULAR END-DIASTOLIC DIMENSION: 3.23 CM
RV TISSUE DOPPLER FREE WALL SYSTOLIC VELOCITY 1 (APICAL 4 CHAMBER VIEW): 13.27 CM/S
SODIUM SERPL-SCNC: 137 MMOL/L (ref 136–145)
TDI LATERAL: 0.07 M/S
TDI SEPTAL: 0.05 M/S
TDI: 0.06 M/S
TRICUSPID ANNULAR PLANE SYSTOLIC EXCURSION: 2.46 CM
WBC # BLD AUTO: 6.93 K/UL (ref 3.9–12.7)

## 2022-07-07 PROCEDURE — 88307 TISSUE EXAM BY PATHOLOGIST: CPT | Performed by: PATHOLOGY

## 2022-07-07 PROCEDURE — 88307 TISSUE EXAM BY PATHOLOGIST: CPT | Mod: 26,,, | Performed by: PATHOLOGY

## 2022-07-07 PROCEDURE — 84100 ASSAY OF PHOSPHORUS: CPT | Performed by: STUDENT IN AN ORGANIZED HEALTH CARE EDUCATION/TRAINING PROGRAM

## 2022-07-07 PROCEDURE — 63600175 PHARM REV CODE 636 W HCPCS

## 2022-07-07 PROCEDURE — 88313 PR  SPECIAL STAINS,GROUP II: ICD-10-PCS | Mod: 26,,, | Performed by: PATHOLOGY

## 2022-07-07 PROCEDURE — 85610 PROTHROMBIN TIME: CPT

## 2022-07-07 PROCEDURE — 36415 COLL VENOUS BLD VENIPUNCTURE: CPT

## 2022-07-07 PROCEDURE — 83020 HEMOGLOBIN ELECTROPHORESIS: CPT

## 2022-07-07 PROCEDURE — 88333 PR  INTRAOPERATIVE CYTO PATH CONSULT, INITIAL SITE: ICD-10-PCS | Mod: 26,,, | Performed by: PATHOLOGY

## 2022-07-07 PROCEDURE — 88333 PATH CONSLTJ SURG CYTO XM 1: CPT | Performed by: PATHOLOGY

## 2022-07-07 PROCEDURE — 83735 ASSAY OF MAGNESIUM: CPT | Performed by: STUDENT IN AN ORGANIZED HEALTH CARE EDUCATION/TRAINING PROGRAM

## 2022-07-07 PROCEDURE — 85025 COMPLETE CBC W/AUTO DIFF WBC: CPT | Performed by: STUDENT IN AN ORGANIZED HEALTH CARE EDUCATION/TRAINING PROGRAM

## 2022-07-07 PROCEDURE — 88333 PATH CONSLTJ SURG CYTO XM 1: CPT | Mod: 26,,, | Performed by: PATHOLOGY

## 2022-07-07 PROCEDURE — 88313 SPECIAL STAINS GROUP 2: CPT | Mod: 26,,, | Performed by: PATHOLOGY

## 2022-07-07 PROCEDURE — 81256 HFE GENE: CPT | Performed by: STUDENT IN AN ORGANIZED HEALTH CARE EDUCATION/TRAINING PROGRAM

## 2022-07-07 PROCEDURE — 25000003 PHARM REV CODE 250

## 2022-07-07 PROCEDURE — 63600175 PHARM REV CODE 636 W HCPCS: Performed by: RADIOLOGY

## 2022-07-07 PROCEDURE — 88313 SPECIAL STAINS GROUP 2: CPT | Performed by: PATHOLOGY

## 2022-07-07 PROCEDURE — 88307 PR  SURG PATH,LEVEL V: ICD-10-PCS | Mod: 26,,, | Performed by: PATHOLOGY

## 2022-07-07 PROCEDURE — 11000001 HC ACUTE MED/SURG PRIVATE ROOM

## 2022-07-07 PROCEDURE — 25000003 PHARM REV CODE 250: Performed by: RADIOLOGY

## 2022-07-07 PROCEDURE — 63600175 PHARM REV CODE 636 W HCPCS: Performed by: STUDENT IN AN ORGANIZED HEALTH CARE EDUCATION/TRAINING PROGRAM

## 2022-07-07 PROCEDURE — 80053 COMPREHEN METABOLIC PANEL: CPT | Performed by: STUDENT IN AN ORGANIZED HEALTH CARE EDUCATION/TRAINING PROGRAM

## 2022-07-07 PROCEDURE — 25000003 PHARM REV CODE 250: Performed by: STUDENT IN AN ORGANIZED HEALTH CARE EDUCATION/TRAINING PROGRAM

## 2022-07-07 RX ORDER — LEVETIRACETAM 500 MG/1
2000 TABLET ORAL ONCE
Status: COMPLETED | OUTPATIENT
Start: 2022-07-07 | End: 2022-07-07

## 2022-07-07 RX ORDER — LEVETIRACETAM 500 MG/1
1000 TABLET ORAL 2 TIMES DAILY
Status: DISCONTINUED | OUTPATIENT
Start: 2022-07-07 | End: 2022-07-11 | Stop reason: HOSPADM

## 2022-07-07 RX ORDER — LIDOCAINE HYDROCHLORIDE 10 MG/ML
INJECTION INFILTRATION; PERINEURAL CODE/TRAUMA/SEDATION MEDICATION
Status: COMPLETED | OUTPATIENT
Start: 2022-07-07 | End: 2022-07-07

## 2022-07-07 RX ORDER — SODIUM CHLORIDE AND POTASSIUM CHLORIDE 300; 900 MG/100ML; MG/100ML
INJECTION, SOLUTION INTRAVENOUS CONTINUOUS
Status: DISCONTINUED | OUTPATIENT
Start: 2022-07-07 | End: 2022-07-08

## 2022-07-07 RX ORDER — FENTANYL CITRATE 50 UG/ML
INJECTION, SOLUTION INTRAMUSCULAR; INTRAVENOUS CODE/TRAUMA/SEDATION MEDICATION
Status: COMPLETED | OUTPATIENT
Start: 2022-07-07 | End: 2022-07-07

## 2022-07-07 RX ORDER — MIDAZOLAM HYDROCHLORIDE 1 MG/ML
INJECTION INTRAMUSCULAR; INTRAVENOUS CODE/TRAUMA/SEDATION MEDICATION
Status: COMPLETED | OUTPATIENT
Start: 2022-07-07 | End: 2022-07-07

## 2022-07-07 RX ORDER — SODIUM CHLORIDE 9 MG/ML
INJECTION, SOLUTION INTRAVENOUS
Status: COMPLETED | OUTPATIENT
Start: 2022-07-07 | End: 2022-07-07

## 2022-07-07 RX ADMIN — SODIUM CHLORIDE 500 ML: 0.9 INJECTION, SOLUTION INTRAVENOUS at 09:07

## 2022-07-07 RX ADMIN — LEVETIRACETAM 1000 MG: 500 TABLET, FILM COATED ORAL at 08:07

## 2022-07-07 RX ADMIN — HEPARIN SODIUM 5000 UNITS: 5000 INJECTION INTRAVENOUS; SUBCUTANEOUS at 01:07

## 2022-07-07 RX ADMIN — SODIUM CHLORIDE AND POTASSIUM CHLORIDE: 9; 2.98 INJECTION, SOLUTION INTRAVENOUS at 01:07

## 2022-07-07 RX ADMIN — SODIUM CHLORIDE AND POTASSIUM CHLORIDE: 9; 2.98 INJECTION, SOLUTION INTRAVENOUS at 11:07

## 2022-07-07 RX ADMIN — LIDOCAINE HYDROCHLORIDE 5 ML: 10 INJECTION, SOLUTION INFILTRATION; PERINEURAL at 09:07

## 2022-07-07 RX ADMIN — CARVEDILOL 12.5 MG: 12.5 TABLET, FILM COATED ORAL at 08:07

## 2022-07-07 RX ADMIN — AMLODIPINE BESYLATE 10 MG: 5 TABLET ORAL at 08:07

## 2022-07-07 RX ADMIN — GELATIN ABSORBABLE SPONGE SIZE 100 1 APPLICATOR: MISC at 10:07

## 2022-07-07 RX ADMIN — FENTANYL CITRATE 50 MCG: 50 INJECTION INTRAMUSCULAR; INTRAVENOUS at 09:07

## 2022-07-07 RX ADMIN — MIDAZOLAM HYDROCHLORIDE 1 MG: 1 INJECTION INTRAMUSCULAR; INTRAVENOUS at 09:07

## 2022-07-07 RX ADMIN — HEPARIN SODIUM 5000 UNITS: 5000 INJECTION INTRAVENOUS; SUBCUTANEOUS at 08:07

## 2022-07-07 RX ADMIN — LEVETIRACETAM 2000 MG: 500 TABLET, FILM COATED ORAL at 06:07

## 2022-07-07 NOTE — NURSING
Pt received from IR in stable condition in no acute distress. Diet resumed. Will continue to monitor.

## 2022-07-07 NOTE — CONSULTS
Interventional Radiology Consult/Pre-Procedure Note      Chief Complaint/Reason for Consult: Elevated LFTs    History of Present Illness:  Niecy Bolivar is a 46 y.o. male with the PMH listed below who presents with elevated LFTs. IR consulted for random liver bx.    Admission H&P reviewed.    No past medical history on file.  No past surgical history on file.    Allergies:   Review of patient's allergies indicates:  No Known Allergies    Scheduled Meds:    amLODIPine  10 mg Oral Daily    carvediloL  12.5 mg Oral BID    heparin (porcine)  5,000 Units Subcutaneous Q8H    levETIRAcetam  1,000 mg Oral BID     Continuous Infusions:   PRN Meds:naloxone, sodium chloride 0.9%    Anticoagulation/Antiplatelet Meds: no anticoagulation    Review of Systems:   As documented in admission H&P.    Physical Exam:  Temp: 97.6 °F (36.4 °C) (07/07/22 0803)  Pulse: 64 (07/07/22 0803)  Resp: 20 (07/07/22 0803)  BP: 122/70 (07/07/22 0803)  SpO2: 97 % (07/07/22 0803)     General: WNWD, NAD  HEENT: Normocephalic, sclera anicteric, oropharynx clear  Neck: Supple, no palpable lymphadenopathy  Heart: RRR  Lungs: Symmetric excursions, breathing unlabored  Abd: NTND, soft  Extremities: STALLINGS  Neuro: Gross nonfocal    Labs:  Recent Labs   Lab 07/07/22  0511   INR 1.2       Recent Labs   Lab 07/07/22  0511   WBC 6.93   HGB 13.0*   HCT 36.8*   MCV 81*         Recent Labs   Lab 07/03/22  1959 07/04/22  0502 07/07/22  0510   GLU  --    < > 108   NA  --    < > 137   K  --    < > 3.0*   CL  --    < > 103   CO2  --    < > 24   BUN  --    < > 21*   CREATININE  --    < > 2.1*   CALCIUM  --    < > 9.4   MG  --    < > 1.8   ALT  --    < > 490*   AST  --    < > 397*   ALBUMIN  --    < > 3.0*   BILITOT  --    < > 6.7*   BILIDIR 5.1*  --   --     < > = values in this interval not displayed.       Assessment/Plan:   Elevated LFTs. Will undergo random liver bx today.    Sedation:  Sedation history: have not been any systemic reactions  ASA: 3 /  Mallampati: 3  Sedation plan: Up to moderate (Versed, fentanyl)     Risks (including, but not limited to, pain, bleeding, infection, damage to nearby structures, treatment failure/recurrence, and the need for additional procedures), potential benefits, and alternatives were discussed with the patient. All questions were answered to the best of my abilities. The patient wishes to proceed. Written informed consent was obtained.      Prateek Olivera MD  [facility]  Pager [phone]

## 2022-07-07 NOTE — PROGRESS NOTES
[facility] Medicine Progress Note    Primary Team: [facility] Hospitalist Team A  Attending Physician: Greg Pack MD  Resident: Ebony  Intern: [facility] Day:  4    Chief Complaint:  near syncopal episode (Patient was at work and admits that he may have taken too much of his BP medication. Patient became dizzy and near fainted. BP was 90's palpable and improved after fluid bolus. EMS started iv access and gave fluid bolus of 150. Denies pain at present time. [place])      Subjective:      Patient seen and examined by me this morning. No complaints overnight and no complaints this morning.     Review of Systems   Constitutional: Negative for chills and fever.   HENT: Negative for ear pain and hearing loss.    Eyes: Negative for blurred vision and double vision.   Respiratory: Negative for cough and shortness of breath.    Cardiovascular: Negative for chest pain, palpitations and leg swelling.   Gastrointestinal: Negative for abdominal pain, constipation, diarrhea, heartburn, nausea and vomiting.   Genitourinary: Negative for dysuria, frequency and urgency.   Musculoskeletal: Negative for falls and myalgias.   Skin: Negative for itching and rash.   Neurological: Negative for dizziness and headaches.   Endo/Heme/Allergies: Negative for polydipsia. Does not bruise/bleed easily.   Psychiatric/Behavioral: Negative for depression and memory loss. The patient is not nervous/anxious and does not have insomnia.       No past medical history on file.           Objective:   Last 24 Hour Vital Signs:  BP  Min: 120/72  Max: 140/72  Temp  Av °F (36.7 °C)  Min: 97.3 °F (36.3 °C)  Max: 98.4 °F (36.9 °C)  Pulse  Av.5  Min: 64  Max: 72  Resp  Av.8  Min: 16  Max: 20  SpO2  Av.3 %  Min: 94 %  Max: 100 %  Weight  Av kg (273 lb 5.9 oz)  Min: 124 kg (273 lb 5.9 oz)  Max: 124 kg (273 lb 5.9 oz)    Intake/Output Summary (Last 24 hours) at 2022 1015  Last data filed at 2022  0504  Gross per 24 hour   Intake 240 ml   Output 1950 ml   Net -1710 ml      Physical Examination:  Physical Exam  Vitals reviewed.   Constitutional:       Appearance: Normal appearance. He is normal weight.   HENT:      Head: Normocephalic and atraumatic.      Right Ear: External ear normal.      Left Ear: External ear normal.      Nose: Nose normal.      Mouth/Throat:      Mouth: Mucous membranes are moist.      Tongue: Tongue lesions: icterus present at frenulum.   Eyes:      General: Scleral icterus present.      Extraocular Movements: Extraocular movements intact.      Pupils: Pupils are equal, round, and reactive to light.   Cardiovascular:      Rate and Rhythm: Normal rate and regular rhythm.      Pulses: Normal pulses.      Heart sounds: Normal heart sounds. No murmur heard.  Pulmonary:      Effort: Pulmonary effort is normal. No respiratory distress.      Breath sounds: Normal breath sounds. No wheezing or rales.   Abdominal:      General: Abdomen is flat. Bowel sounds are normal. There is no distension.      Palpations: Abdomen is soft.      Tenderness: There is no abdominal tenderness.   Skin:     General: Skin is warm and dry.      Capillary Refill: Capillary refill takes less than 2 seconds.   Neurological:      General: No focal deficit present.      Mental Status: He is alert and oriented to person, place, and time. Mental status is at baseline.   Psychiatric:         Mood and Affect: Mood normal.         Behavior: Behavior normal.         Thought Content: Thought content normal.        Laboratory:  Recent Labs   Lab 07/03/22  1625 07/04/22  0502 07/05/22  0510 07/06/22  0457 07/07/22  0510 07/07/22  0511   WBC 8.84 9.13 7.82 7.43  --  6.93   HGB 13.0* 12.4* 12.4* 12.0*  --  13.0*   HCT 37.1* 35.6* 35.4* 35.4*  --  36.8*    235 240 254  --  255   MCV 82 83 82 83  --  81*   RDW 21.7* 21.6* 21.7* 21.2*  --  21.6*    144 139 141 137  --    K 3.0* 3.0* 3.0* 3.6 3.0*  --     106 103 106  103  --    CO2 25 26 23 25 24  --    BUN 16 15 19 18 21*  --    CREATININE 2.1* 1.7* 1.8* 1.6* 2.1*  --     104 91 87 108  --    PROT 7.8 7.0 7.2 7.0 7.7  --    ALBUMIN 3.2* 2.8* 2.8* 2.9* 3.0*  --    BILITOT 6.9* 7.0* 7.8* 7.3* 6.7*  --    * 261* 414* 405* 397*  --    ALKPHOS 190* 183* 177* 168* 194*  --    * 411* 481* 484* 490*  --      Urinalysis  No results for input(s): COLORU, CLARITYU, SPECGRAV, PHUR, PROTEINUA, GLUCOSEU, BILIRUBINCON, BLOODU, WBCU, RBCU, BACTERIA, MUCUS, NITRITE, LEUKOCYTESUR, UROBILINOGEN, HYALINECASTS in the last 24 hours.    Microbiology Results (last 7 days)     ** No results found for the last 168 hours. **           I have personally reviewed the above laboratory studies.     Imaging:  EKG (my interpretation): no new    US Liver with Doppler (xpd)   Final Result      Hepatomegaly and normal appearance of the gallbladder and biliary tree.      Normal Doppler interrogation of intra and extrahepatic vessels.         Electronically signed by: Jae Bolanos MD   Date:    07/06/2022   Time:    09:10      MRI MRCP Without Contrast   Final Result      No biliary dilatation or evidence for choledocholithiasis.  Further assessment with ERCP as warranted.      Additional findings as above.         Electronically signed by: Gene Guardado   Date:    07/05/2022   Time:    12:00      US Abdomen Complete   Final Result      Nonspecific gallbladder wall thickening which may be reactive or may be accentuated by gallbladder contraction.  No cholelithiasis or sonographic evidence of acute cholecystitis.      Hepatomegaly.         Electronically signed by: Kit Johnson   Date:    07/03/2022   Time:    22:20      X-Ray Chest AP Portable   Final Result      No radiographic acute intrathoracic process seen on this single view.         Electronically signed by: Gentry Grant MD   Date:    07/03/2022   Time:    16:23          Current Medications:     Scheduled:   amLODIPine  10 mg Oral  Daily    carvediloL  12.5 mg Oral BID    heparin (porcine)  5,000 Units Subcutaneous Q8H    levETIRAcetam  1,000 mg Oral BID         Infusions:       PRN:  naloxone, sodium chloride 0.9%  Antibiotics and Day Number of Therapy:  Antibiotics (From admission, onward)            None             Assessment:     Niecy Bolivar is a 46 y.o. male with a PMHx of HTN who [place] [facility] [place] w/ near syncope for one day. Found to have been taking double his hydralazine dose for one month. Also found to have JOÃO with elevated Cr, transaminitis, and hyperbilirubinemia. Hypokalemia also present. No history of renal disease or hepatic disease. No history of EtOH use. IV fluids given by ED with transfer to [facility] for further work up and management of JOÃO, hyperbilirubinemia, elevated liver function labs. Renal function improved on 07/04 with continued up trend in Tbilli, AST, ALT. Hepatitis Panels still in progress. GI consulted and recommend repeat bilirubin as they suspect increase due to ischemia and that levels will begin to downtrend.Tbilli and ALT, AST continued to uptrend on 07/05 so MRCP performed with no acute findings. Hypokalemia resolved with K repletion and Mg. Liver US with doppler today with no acute findings. GI rec discontinuing lamictal and possible liver biopsy due to unremarkable findings on liver imaging.Neurologty consulted for AED management and recommended switching patient to Keppra. Labs on 07/06 significant for possible hemochromatosis with elevated ferritin and iron saturation. Will proceed with liver biopsy with IR this AM.    Plan:     JOÃO   -Cr on admit 2.1, baseline unknown  -likely 2/2 to low volume status due to accidental over use of home antihypertensives compounded by sweating outside in heat  -Cr improved to 1.7 on 07/04 with IV fluids, 1.6 on 07/06  -Cr increased again on 07/07 to 2.1 (from 1.6)  -Continue to replete fluids  -Avoid nephrotoxic drugs, contrast        Near  Syncope/ Dizziness  -Pt hypotensive per EMS to 90's upon their arrival  -Improved with fluid bolus  -No complaints of dizziness/fainting at this time  -Likely 2/2 to taking double hydralazine dose and compounded by hot weather  -CTM      Hyperbilirubinemia  -Tbilli 6.9 on admit  -Direct Bilirubin elevated at 5.1  -Tbilli trending upward at 7 (07/04) and 7.8 (07/05)  -Tbilli trending down this AM at 7.3   -GI consulted and recommended trending Tbilli with MRCP suggested if continued uptrend  -Abdominal US with gall bladder wall thickening, enlarged liver  -Daily CMP  -MRCP performed with no acute findings, ativan 1mg administered for claustrophobia  -Liver US this morning with no findings  -Labs on 07/06 significant for ferritin 2008, iron 253, iron sat 84   -findings support possible hemochromatosis  -Proceed with liver biopsy this AM, heparin held  -CTM and trend     Elevated Liver Enzymes  #transaminitis   #elevated Alk phos   -, , Alk phos 190 on admit  -Hepatitis Panel in progress  -Improved to , , Alk phos 183 on 07/04  -Worsened again to ,  on 07/05 and remained at near same levels on 07/06, 07/07  -MRCP ordered per GI recs, no acute findings  -Liver US today with no findings  -Labs on 07/06 significant for ferritin 2008, iron 253, iron sat 84   -findings support possible hemochromatosis  -Proceed with biopsy this AM, heparin held       Hypokalemia/Hypomag  -K 3, Mg 1.7 on admit  -S/p K 50meq on 07/03, s/p K 50 meq on 07/04  -S/p Mg 2g on 07/04  -No change in K overnight on 06/03, remains at 3 morning of 06/04  -Mg 1.9 on 07/05, Mg 2g ordered   -Hypokalemia resolved 07/06 with K 3.6   -K back down to 3 on 07/07, Mg 1.8  -replete PRN     Essential HTN  -patient on chlorthalidone 25 mg daily, amlodipine 10mg daily, carvedilol 12.5mg BID, hydralazine 100mg (0.5 tablets) TID  -Patient reports doubling hydralazine dose for last month  -Amlodipine resumed and will titrate  PRN  -Pressures elevated overnight 170's/  -Coreg restarted        Seizure Disorder  -Seizure free for years  -Stop home Lamictal 50mg in setting of suspected hepatic injury  -Loading dose of Keppra 2000 w/ 1000BID daily dosing to follow      Diet: Diet NPO   DVT Prophylaxis: Hep  Code: Full Code     Dispo: hemochromatosis work up w/ liver biopsy    Gigi Becker M.D.  [facility] Neurology PGY-1    [facility] Hospitalist Pager numbers:   [facility] Hospitalist Medicine Team A (Sirena/Laura): [phone]  [facility] Hospitalist Medicine Team B (Rober/Preston):  [phone]

## 2022-07-07 NOTE — PLAN OF CARE
Pt is AAOx4. Pt given medications as ordered per MAR. IVFs infusing continuously. Pt tolerating regular diet. Pt ambulating independently. Urinal provided and within reach. No c/o pain, n/v, or sob. Safety maintained. Instructed to use call light for assistance. Will continue to monitor.         Problem: Adult Inpatient Plan of Care  Goal: Optimal Comfort and Wellbeing  Outcome: Ongoing, Progressing     Problem: Fall Injury Risk  Goal: Absence of Fall and Fall-Related Injury  Outcome: Ongoing, Progressing

## 2022-07-07 NOTE — PROCEDURES
Interventional Radiology Immediate Post-Procedure Note    Pre-Op Diagnosis: Elevated LFTs  Post-Op Diagnosis: Same    Procedure: US-guided coaxial core needle bx    Procedure performed by: Prateek Olivera MD  Assistants: None    Estimated Blood Loss: Minimal  Specimen Removed: Yes    Findings/description of procedure:  18-ga cores x4 taken from right lobe of the liver. Tract embolized with Gel-Foam sponge slurry.    No immediate complications. Patient tolerated procedure well. Please see full dictated procedure report for additional details and recommendations.      Prateek Olivera MD  [facility]  Pager [phone]

## 2022-07-07 NOTE — PLAN OF CARE
CM met with pt - he plans to d/c to a local hotel at d/c   pt will need transportation to hotel at d/c     pt's pcp in [place] is Macy Sung MD  P [phone]  - Friday CM - please call office for fax # and send pt's d/c summary to office. Pt states he has a f/u apt with her this month.       Pt is currently residing at the [address] [place] [address] and will return to this hotel at time of d/c    Future Appointments   Date Time Provider Department Center   7/15/2022  9:00 AM Kerry Umanzor MD [facility] [place] [facility]   pt will try to make pcc apt providing he gets transportation to apt.         07/07/22 1824   Post-Acute Status   Post-Acute Authorization Other   Other Status No Post-Acute Service Needs   Hospital Resources/Appts/Education Provided Appointments scheduled and added to AVS   Discharge Delays None known at this time   Discharge Plan   Discharge Plan A Home

## 2022-07-08 LAB
ALBUMIN SERPL BCP-MCNC: 2.8 G/DL (ref 3.5–5.2)
ALP SERPL-CCNC: 170 U/L (ref 55–135)
ALT SERPL W/O P-5'-P-CCNC: 412 U/L (ref 10–44)
ANION GAP SERPL CALC-SCNC: 12 MMOL/L (ref 8–16)
AST SERPL-CCNC: 297 U/L (ref 10–40)
BASOPHILS # BLD AUTO: 0.07 K/UL (ref 0–0.2)
BASOPHILS NFR BLD: 0.9 % (ref 0–1.9)
BILIRUB SERPL-MCNC: 5 MG/DL (ref 0.1–1)
BUN SERPL-MCNC: 24 MG/DL (ref 6–20)
CALCIUM SERPL-MCNC: 8.8 MG/DL (ref 8.7–10.5)
CHLORIDE SERPL-SCNC: 110 MMOL/L (ref 95–110)
CO2 SERPL-SCNC: 20 MMOL/L (ref 23–29)
CREAT SERPL-MCNC: 1.7 MG/DL (ref 0.5–1.4)
DIFFERENTIAL METHOD: ABNORMAL
EOSINOPHIL # BLD AUTO: 0.2 K/UL (ref 0–0.5)
EOSINOPHIL NFR BLD: 2.3 % (ref 0–8)
ERYTHROCYTE [DISTWIDTH] IN BLOOD BY AUTOMATED COUNT: 21.8 % (ref 11.5–14.5)
EST. GFR  (AFRICAN AMERICAN): 55 ML/MIN/1.73 M^2
EST. GFR  (NON AFRICAN AMERICAN): 48 ML/MIN/1.73 M^2
GLUCOSE SERPL-MCNC: 84 MG/DL (ref 70–110)
HCT VFR BLD AUTO: 34.8 % (ref 40–54)
HGB BLD-MCNC: 11.9 G/DL (ref 14–18)
IMM GRANULOCYTES # BLD AUTO: 0.02 K/UL (ref 0–0.04)
IMM GRANULOCYTES NFR BLD AUTO: 0.3 % (ref 0–0.5)
LYMPHOCYTES # BLD AUTO: 3.5 K/UL (ref 1–4.8)
LYMPHOCYTES NFR BLD: 45.4 % (ref 18–48)
MAGNESIUM SERPL-MCNC: 1.8 MG/DL (ref 1.6–2.6)
MCH RBC QN AUTO: 28.7 PG (ref 27–31)
MCHC RBC AUTO-ENTMCNC: 34.2 G/DL (ref 32–36)
MCV RBC AUTO: 84 FL (ref 82–98)
MONOCYTES # BLD AUTO: 0.9 K/UL (ref 0.3–1)
MONOCYTES NFR BLD: 11.6 % (ref 4–15)
NEUTROPHILS # BLD AUTO: 3.1 K/UL (ref 1.8–7.7)
NEUTROPHILS NFR BLD: 39.5 % (ref 38–73)
NRBC BLD-RTO: 0 /100 WBC
PHOSPHATE SERPL-MCNC: 3.9 MG/DL (ref 2.7–4.5)
PLATELET # BLD AUTO: 213 K/UL (ref 150–450)
PMV BLD AUTO: 10.7 FL (ref 9.2–12.9)
POTASSIUM SERPL-SCNC: 3.8 MMOL/L (ref 3.5–5.1)
PROT SERPL-MCNC: 6.6 G/DL (ref 6–8.4)
RBC # BLD AUTO: 4.15 M/UL (ref 4.6–6.2)
SODIUM SERPL-SCNC: 142 MMOL/L (ref 136–145)
WBC # BLD AUTO: 7.75 K/UL (ref 3.9–12.7)

## 2022-07-08 PROCEDURE — 63600175 PHARM REV CODE 636 W HCPCS: Performed by: STUDENT IN AN ORGANIZED HEALTH CARE EDUCATION/TRAINING PROGRAM

## 2022-07-08 PROCEDURE — 85025 COMPLETE CBC W/AUTO DIFF WBC: CPT | Performed by: STUDENT IN AN ORGANIZED HEALTH CARE EDUCATION/TRAINING PROGRAM

## 2022-07-08 PROCEDURE — 36415 COLL VENOUS BLD VENIPUNCTURE: CPT | Performed by: STUDENT IN AN ORGANIZED HEALTH CARE EDUCATION/TRAINING PROGRAM

## 2022-07-08 PROCEDURE — 63600175 PHARM REV CODE 636 W HCPCS

## 2022-07-08 PROCEDURE — 83735 ASSAY OF MAGNESIUM: CPT | Performed by: STUDENT IN AN ORGANIZED HEALTH CARE EDUCATION/TRAINING PROGRAM

## 2022-07-08 PROCEDURE — 84100 ASSAY OF PHOSPHORUS: CPT | Performed by: STUDENT IN AN ORGANIZED HEALTH CARE EDUCATION/TRAINING PROGRAM

## 2022-07-08 PROCEDURE — 25000003 PHARM REV CODE 250: Performed by: STUDENT IN AN ORGANIZED HEALTH CARE EDUCATION/TRAINING PROGRAM

## 2022-07-08 PROCEDURE — 80053 COMPREHEN METABOLIC PANEL: CPT | Performed by: STUDENT IN AN ORGANIZED HEALTH CARE EDUCATION/TRAINING PROGRAM

## 2022-07-08 PROCEDURE — 25000003 PHARM REV CODE 250

## 2022-07-08 PROCEDURE — 11000001 HC ACUTE MED/SURG PRIVATE ROOM

## 2022-07-08 RX ORDER — MAGNESIUM SULFATE HEPTAHYDRATE 40 MG/ML
2 INJECTION, SOLUTION INTRAVENOUS ONCE
Status: COMPLETED | OUTPATIENT
Start: 2022-07-08 | End: 2022-07-08

## 2022-07-08 RX ORDER — SODIUM CHLORIDE AND POTASSIUM CHLORIDE 150; 900 MG/100ML; MG/100ML
INJECTION, SOLUTION INTRAVENOUS CONTINUOUS
Status: DISPENSED | OUTPATIENT
Start: 2022-07-08 | End: 2022-07-09

## 2022-07-08 RX ADMIN — HEPARIN SODIUM 5000 UNITS: 5000 INJECTION INTRAVENOUS; SUBCUTANEOUS at 12:07

## 2022-07-08 RX ADMIN — HEPARIN SODIUM 5000 UNITS: 5000 INJECTION INTRAVENOUS; SUBCUTANEOUS at 10:07

## 2022-07-08 RX ADMIN — HEPARIN SODIUM 5000 UNITS: 5000 INJECTION INTRAVENOUS; SUBCUTANEOUS at 05:07

## 2022-07-08 RX ADMIN — SODIUM CHLORIDE AND POTASSIUM CHLORIDE: 9; 2.98 INJECTION, SOLUTION INTRAVENOUS at 06:07

## 2022-07-08 RX ADMIN — CARVEDILOL 12.5 MG: 12.5 TABLET, FILM COATED ORAL at 08:07

## 2022-07-08 RX ADMIN — AMLODIPINE BESYLATE 10 MG: 5 TABLET ORAL at 08:07

## 2022-07-08 RX ADMIN — LEVETIRACETAM 1000 MG: 500 TABLET, FILM COATED ORAL at 08:07

## 2022-07-08 RX ADMIN — MAGNESIUM SULFATE HEPTAHYDRATE 2 G: 40 INJECTION, SOLUTION INTRAVENOUS at 08:07

## 2022-07-08 RX ADMIN — SODIUM CHLORIDE AND POTASSIUM CHLORIDE: .9; .15 SOLUTION INTRAVENOUS at 12:07

## 2022-07-08 RX ADMIN — SODIUM CHLORIDE AND POTASSIUM CHLORIDE: .9; .15 SOLUTION INTRAVENOUS at 09:07

## 2022-07-08 NOTE — PROGRESS NOTES
[facility] Medicine Progress Note    Primary Team: [facility] Hospitalist Team A  Attending Physician: Greg Pack MD  Resident: Ebony  Intern: [facility] Day:  4    Chief Complaint:  near syncopal episode (Patient was at work and admits that he may have taken too much of his BP medication. Patient became dizzy and near fainted. BP was 90's palpable and improved after fluid bolus. EMS started iv access and gave fluid bolus of 150. Denies pain at present time. [place])      Subjective:   Patient sleeping as I walked in. Patient has no complaints. Denies SOB, chest pain, abdominal pain, F/C, N/V.      Objective:   Last 24 Hour Vital Signs:  BP  Min: 122/70  Max: 155/88  Temp  Av.1 °F (36.7 °C)  Min: 97.4 °F (36.3 °C)  Max: 98.6 °F (37 °C)  Pulse  Av.8  Min: 60  Max: 67  Resp  Av.7  Min: 18  Max: 20  SpO2  Av.2 %  Min: 95 %  Max: 99 %  Height  Av' (182.9 cm)  Min: 6' (182.9 cm)  Max: 6' (182.9 cm)  Weight  Av.8 kg (273 lb)  Min: 123.8 kg (273 lb)  Max: 123.8 kg (273 lb)    Intake/Output Summary (Last 24 hours) at 2022 0743  Last data filed at 2022 0510  Gross per 24 hour   Intake 1878.61 ml   Output 550 ml   Net 1328.61 ml      Physical Examination:  Physical Exam  Vitals reviewed.   Constitutional:       Appearance: Normal appearance. He is normal weight.   HENT:      Head: Normocephalic and atraumatic.      Right Ear: External ear normal.      Left Ear: External ear normal.      Nose: Nose normal.      Mouth/Throat:      Mouth: Mucous membranes are moist.      Tongue: Tongue lesions: icterus present at frenulum.   Eyes:      General: Scleral icterus present.      Extraocular Movements: Extraocular movements intact.      Pupils: Pupils are equal, round, and reactive to light.   Cardiovascular:      Rate and Rhythm: Normal rate and regular rhythm.      Pulses: Normal pulses.      Heart sounds: Normal heart sounds. No murmur heard.  Pulmonary:      Effort:  Pulmonary effort is normal. No respiratory distress.      Breath sounds: Normal breath sounds. No wheezing or rales.   Abdominal:      General: Abdomen is flat. Bowel sounds are normal. There is no distension.      Palpations: Abdomen is soft.      Tenderness: There is no abdominal tenderness.   Skin:     General: Skin is warm and dry.      Capillary Refill: Capillary refill takes less than 2 seconds.   Neurological:      General: No focal deficit present.      Mental Status: He is alert and oriented to person, place, and time. Mental status is at baseline.   Psychiatric:         Mood and Affect: Mood normal.         Behavior: Behavior normal.         Thought Content: Thought content normal.        Laboratory:  Recent Labs   Lab 07/04/22  0502 07/05/22  0510 07/06/22  0457 07/07/22  0510 07/07/22  0511 07/08/22  0522   WBC 9.13 7.82 7.43  --  6.93 7.75   HGB 12.4* 12.4* 12.0*  --  13.0* 11.9*   HCT 35.6* 35.4* 35.4*  --  36.8* 34.8*    240 254  --  255 213   MCV 83 82 83  --  81* 84   RDW 21.6* 21.7* 21.2*  --  21.6* 21.8*    139 141 137  --  142   K 3.0* 3.0* 3.6 3.0*  --  3.8    103 106 103  --  110   CO2 26 23 25 24  --  20*   BUN 15 19 18 21*  --  24*   CREATININE 1.7* 1.8* 1.6* 2.1*  --  1.7*    91 87 108  --  84   PROT 7.0 7.2 7.0 7.7  --  6.6   ALBUMIN 2.8* 2.8* 2.9* 3.0*  --  2.8*   BILITOT 7.0* 7.8* 7.3* 6.7*  --  5.0*   * 414* 405* 397*  --  297*   ALKPHOS 183* 177* 168* 194*  --  170*   * 481* 484* 490*  --  412*     Imaging: no new    Current Medications:     Scheduled:   amLODIPine  10 mg Oral Daily    carvediloL  12.5 mg Oral BID    heparin (porcine)  5,000 Units Subcutaneous Q8H    levETIRAcetam  1,000 mg Oral BID     Infusions:   0.9% NACL & POTASSIUM CHLORIDE 40 MEQ/L 125 mL/hr at 07/08/22 0619     PRN:  naloxone, sodium chloride 0.9%       Assessment:   Niecy Bolivar is a 46 y.o. male with a PMHx of HTN who [place] to [facility] for dizziness w/ near  syncope for one day. Found to have been taking double his hydralazine dose for one month. Also found to have JOÃO with elevated Cr, transaminitis, and hyperbilirubinemia. Hypokalemia also present. No history of renal disease or hepatic disease. No history of EtOH use. IV fluids given by ED with transfer to [facility] for further work up and management of JOÃO, hyperbilirubinemia, elevated liver function labs. Renal function improved on 07/04 with continued up trend in Tbilli, AST, ALT. Hepatitis Panels still in progress. GI consulted and recommend repeat bilirubin as they suspect increase due to ischemia and that levels will begin to downtrend.Tbilli and ALT, AST continued to uptrend on 07/05 so MRCP performed with no acute findings. Hypokalemia resolved with K repletion and Mg. Liver US with doppler today with no acute findings. GI rec discontinuing lamictal and possible liver biopsy due to unremarkable findings on liver imaging. Neurologty consulted for AED management and recommended switching patient to Keppra. Labs on 07/06 significant for possible hemochromatosis with elevated ferritin and iron saturation. Awaiting liver biopsy.    Plan:   JOÃO 2/2 Dehydration - Improving  -Cr on admit 2.1, baseline unknown, accidental over use of home hydralazine, compounded by sweating outside in heat  -Cr improved from admit after fluid administration  -Continue to replete fluids  -Avoid nephrotoxic drugs, contrast      Near Syncope/ Dizziness - Resolved  Hypotensive per EMS to 90's upon their arrival. Improved with fluid bolus  -No complaints of dizziness/fainting at this time  -Likely 2/2 to taking double hydralazine dose and compounded by hot weather  -CTM      Hyperbilirubinemia - Improving  Tbilli 6.9 on admit. Direct Bilirubin elevated at 5.1  -Tbilli improving  -GI consulted and recommended trending Tbilli with MRCP suggested if continued uptrend  -Abdominal US with gall bladder wall thickening, enlarged liver  -Daily  CMP  -MRCP performed with no acute findings, ativan 1mg administered for claustrophobia  -Liver US this morning with no findings  -Labs on 07/06 significant for ferritin 2008, iron 253, iron sat 84   -findings support possible hemochromatosis  -Liver biopsy pending  -CTM and trend     Elevated Liver Enzymes - Improving  #transaminitis   #elevated Alk phos   , , Alk phos 190 on admit. GI following.  -Fluctuations of AST/ALT  -Hepatitis A-C negative  -MRCP showed no acute findings  -Liver US with no findings  -Labs on 07/06 significant for ferritin 2008, iron 253, iron sat 84, possible hemochromatosis, sideroblastic anemia, thalassemia   -pending biopsy results, electrophoresis, blood smear  -Significant improvement today    Normocytic Anemia  On admit H/H 13/37.1, MCV 82. Elevated Ferritin. Further workup.  -Continue to monitor  -Blood smear for possible causes to consider, such as sideroblastic anemia    Hypokalemia/Hypomag - Resolved  K 3, Mg 1.7 on admit  -replete PRN     Essential HTN  -patient on chlorthalidone 25 mg daily, amlodipine 10mg daily, carvedilol 12.5mg BID, hydralazine 100mg (0.5 tablets) TID  -Patient reports doubling hydralazine dose for last month  -Amlodipine resumed and will titrate PRN  -Continue Coreg     Seizure Disorder  Seizure free for years  -Stopped home Lamictal 50mg in setting of suspected hepatic injury  -Neurology following: Loading dose of Keppra 2000 w/ 1000BID daily dosing to follow      Diet: Diet Adult Regular (IDDSI Level 7)   DVT Prophylaxis: Hep  Code: Full Code     Dispo: further workup, pending liver biopsy      Mihaela Mckinney MD  [facility] PGY-1      [facility] Hospitalist Pager numbers:   [facility] Hospitalist Medicine Team A (Sirena/Laura): [phone]  [facility] Hospitalist Medicine Team B (Rober/Preston):  [phone]

## 2022-07-08 NOTE — PLAN OF CARE
met with patient. Patient lives in [place]. He is here for work. I informed him PCC appointment scheduled, if he plans to be here until that date. Patient's PCP in [place] is Macy Sung MD  P [phone] (Patient reports he has an appointment scheduled with his PCP later this month).  will obtain fax to send discharge summary. [facility] [address] [place] [address]. Patient will need transport set up to hot at discharge. No other anticipated discharge needs. Patient encouraged to call with any questions or concerns.  will continue to follow patient through transitions of care and assist with any discharge needs.    1312--CM unable to reach PCP for fax information. I called [phone], but unable to reach anyone. Fax number online states [phone].    Future Appointments   Date Time Provider Department Center   7/15/2022  9:00 AM Kerry Umanzor MD [facility]      07/08/22 1312   Discharge Reassessment   Assessment Type Discharge Planning Reassessment   Did the patient's condition or plan change since previous assessment? No   Discharge Plan discussed with: Patient   Discharge Plan A Home   Discharge Plan B Home with family   DME Needed Upon Discharge  none   Discharge Barriers Identified Transportation   Why the patient remains in the hospital Requires continued medical care   Post-Acute Status   Hospital Resources/Appts/Education Provided Appointments scheduled by Navigator/Coordinator   Discharge Delays None known at this time     Kayla Brown RN    [phone]

## 2022-07-09 LAB
ALBUMIN SERPL BCP-MCNC: 2.7 G/DL (ref 3.5–5.2)
ALP SERPL-CCNC: 160 U/L (ref 55–135)
ALT SERPL W/O P-5'-P-CCNC: 391 U/L (ref 10–44)
ANION GAP SERPL CALC-SCNC: 11 MMOL/L (ref 8–16)
AST SERPL-CCNC: 272 U/L (ref 10–40)
BASOPHILS # BLD AUTO: 0.08 K/UL (ref 0–0.2)
BASOPHILS NFR BLD: 1 % (ref 0–1.9)
BILIRUB SERPL-MCNC: 4.1 MG/DL (ref 0.1–1)
BUN SERPL-MCNC: 18 MG/DL (ref 6–20)
CALCIUM SERPL-MCNC: 8.7 MG/DL (ref 8.7–10.5)
CHLORIDE SERPL-SCNC: 109 MMOL/L (ref 95–110)
CO2 SERPL-SCNC: 20 MMOL/L (ref 23–29)
CREAT SERPL-MCNC: 1.4 MG/DL (ref 0.5–1.4)
DIFFERENTIAL METHOD: ABNORMAL
EOSINOPHIL # BLD AUTO: 0.2 K/UL (ref 0–0.5)
EOSINOPHIL NFR BLD: 2.5 % (ref 0–8)
ERYTHROCYTE [DISTWIDTH] IN BLOOD BY AUTOMATED COUNT: 21.6 % (ref 11.5–14.5)
EST. GFR  (AFRICAN AMERICAN): >60 ML/MIN/1.73 M^2
EST. GFR  (NON AFRICAN AMERICAN): >60 ML/MIN/1.73 M^2
GLUCOSE SERPL-MCNC: 88 MG/DL (ref 70–110)
HCT VFR BLD AUTO: 32.6 % (ref 40–54)
HGB BLD-MCNC: 11.3 G/DL (ref 14–18)
IMM GRANULOCYTES # BLD AUTO: 0.03 K/UL (ref 0–0.04)
IMM GRANULOCYTES NFR BLD AUTO: 0.4 % (ref 0–0.5)
LYMPHOCYTES # BLD AUTO: 3.9 K/UL (ref 1–4.8)
LYMPHOCYTES NFR BLD: 46.2 % (ref 18–48)
MAGNESIUM SERPL-MCNC: 1.7 MG/DL (ref 1.6–2.6)
MCH RBC QN AUTO: 29 PG (ref 27–31)
MCHC RBC AUTO-ENTMCNC: 34.7 G/DL (ref 32–36)
MCV RBC AUTO: 84 FL (ref 82–98)
MONOCYTES # BLD AUTO: 0.9 K/UL (ref 0.3–1)
MONOCYTES NFR BLD: 10.4 % (ref 4–15)
NEUTROPHILS # BLD AUTO: 3.3 K/UL (ref 1.8–7.7)
NEUTROPHILS NFR BLD: 39.5 % (ref 38–73)
NRBC BLD-RTO: 0 /100 WBC
PHOSPHATE SERPL-MCNC: 3.3 MG/DL (ref 2.7–4.5)
PLATELET # BLD AUTO: 198 K/UL (ref 150–450)
PMV BLD AUTO: 10.4 FL (ref 9.2–12.9)
POTASSIUM SERPL-SCNC: 3.2 MMOL/L (ref 3.5–5.1)
PROT SERPL-MCNC: 6.9 G/DL (ref 6–8.4)
RBC # BLD AUTO: 3.9 M/UL (ref 4.6–6.2)
SODIUM SERPL-SCNC: 140 MMOL/L (ref 136–145)
WBC # BLD AUTO: 8.33 K/UL (ref 3.9–12.7)

## 2022-07-09 PROCEDURE — 84100 ASSAY OF PHOSPHORUS: CPT | Performed by: STUDENT IN AN ORGANIZED HEALTH CARE EDUCATION/TRAINING PROGRAM

## 2022-07-09 PROCEDURE — 11000001 HC ACUTE MED/SURG PRIVATE ROOM

## 2022-07-09 PROCEDURE — 83735 ASSAY OF MAGNESIUM: CPT | Performed by: STUDENT IN AN ORGANIZED HEALTH CARE EDUCATION/TRAINING PROGRAM

## 2022-07-09 PROCEDURE — 80053 COMPREHEN METABOLIC PANEL: CPT | Performed by: STUDENT IN AN ORGANIZED HEALTH CARE EDUCATION/TRAINING PROGRAM

## 2022-07-09 PROCEDURE — 85025 COMPLETE CBC W/AUTO DIFF WBC: CPT | Performed by: STUDENT IN AN ORGANIZED HEALTH CARE EDUCATION/TRAINING PROGRAM

## 2022-07-09 PROCEDURE — 25000003 PHARM REV CODE 250

## 2022-07-09 PROCEDURE — 63600175 PHARM REV CODE 636 W HCPCS: Performed by: STUDENT IN AN ORGANIZED HEALTH CARE EDUCATION/TRAINING PROGRAM

## 2022-07-09 PROCEDURE — 25000003 PHARM REV CODE 250: Performed by: STUDENT IN AN ORGANIZED HEALTH CARE EDUCATION/TRAINING PROGRAM

## 2022-07-09 PROCEDURE — 36415 COLL VENOUS BLD VENIPUNCTURE: CPT | Performed by: STUDENT IN AN ORGANIZED HEALTH CARE EDUCATION/TRAINING PROGRAM

## 2022-07-09 RX ORDER — MAGNESIUM SULFATE HEPTAHYDRATE 40 MG/ML
4 INJECTION, SOLUTION INTRAVENOUS ONCE
Status: COMPLETED | OUTPATIENT
Start: 2022-07-09 | End: 2022-07-09

## 2022-07-09 RX ORDER — SODIUM CHLORIDE, SODIUM LACTATE, POTASSIUM CHLORIDE, CALCIUM CHLORIDE 600; 310; 30; 20 MG/100ML; MG/100ML; MG/100ML; MG/100ML
INJECTION, SOLUTION INTRAVENOUS CONTINUOUS
Status: DISCONTINUED | OUTPATIENT
Start: 2022-07-09 | End: 2022-07-10

## 2022-07-09 RX ADMIN — LEVETIRACETAM 1000 MG: 500 TABLET, FILM COATED ORAL at 08:07

## 2022-07-09 RX ADMIN — AMLODIPINE BESYLATE 10 MG: 5 TABLET ORAL at 08:07

## 2022-07-09 RX ADMIN — HEPARIN SODIUM 5000 UNITS: 5000 INJECTION INTRAVENOUS; SUBCUTANEOUS at 06:07

## 2022-07-09 RX ADMIN — SODIUM CHLORIDE, SODIUM LACTATE, POTASSIUM CHLORIDE, AND CALCIUM CHLORIDE: .6; .31; .03; .02 INJECTION, SOLUTION INTRAVENOUS at 07:07

## 2022-07-09 RX ADMIN — HEPARIN SODIUM 5000 UNITS: 5000 INJECTION INTRAVENOUS; SUBCUTANEOUS at 02:07

## 2022-07-09 RX ADMIN — SODIUM CHLORIDE AND POTASSIUM CHLORIDE: .9; .15 SOLUTION INTRAVENOUS at 06:07

## 2022-07-09 RX ADMIN — HEPARIN SODIUM 5000 UNITS: 5000 INJECTION INTRAVENOUS; SUBCUTANEOUS at 10:07

## 2022-07-09 RX ADMIN — CARVEDILOL 12.5 MG: 12.5 TABLET, FILM COATED ORAL at 08:07

## 2022-07-09 RX ADMIN — MAGNESIUM SULFATE HEPTAHYDRATE 4 G: 40 INJECTION, SOLUTION INTRAVENOUS at 10:07

## 2022-07-09 RX ADMIN — POTASSIUM BICARBONATE 50 MEQ: 978 TABLET, EFFERVESCENT ORAL at 10:07

## 2022-07-09 NOTE — PROGRESS NOTES
[facility] Medicine Progress Note    Primary Team: [facility] Hospitalist Team A  Attending Physician: Greg Pack MD  Resident:  / Mehdi  Intern: Faye Becker    Subjective:      The patient denies any new complaints or concerns. Been receiving IVF; a total of 500 mL administered since evaluation yesterday morning. Appetite intact. BM and urination intact. Denies CP, SOB, abdominal pain, n/v/d.     Objective:     Last 24 Hour Vital Signs:  BP  Min: 140/81  Max: 167/97  Temp  Av.4 °F (36.9 °C)  Min: 98 °F (36.7 °C)  Max: 98.7 °F (37.1 °C)  Pulse  Av.8  Min: 61  Max: 76  Resp  Av.5  Min: 16  Max: 18  SpO2  Av.2 %  Min: 97 %  Max: 99 %  I/O last 3 completed shifts:  In: 2392.5 [P.O.:120; I.V.:2272.5]  Out: 3350 [Urine:3350]    Physical Examination:  General: resting comfortably upon entrance to exam room. NAD. Well-appearing.   HENT: Normocephalic and atraumatic head. External exam normal. Moist mucous membranes.  Eyes: EOMI. PERRL. No scleral icterus.   Cardiovascular: RRR. No murmurs, rubs, gallops.  Pulmonary: TRES. Symmetric chest rise. Normal breath sounds.  Abdominal: Soft, non-distended, non-tender abdomen.   Skin: No bruises, rashes, lesions. Warm and dry skin.  Neurological: AAOx3. Baseline mental status. No obvious focal deficits.      Laboratory:  Laboratory Data Reviewed: yes  Pertinent Findings:              Other Results:  Radiology-guided Core Biopsy (Liver): In process      Current Medications:     Infusions:   0/9% NACL & POTASSIUM CHLORIDE 20 MEQ/L 125 mL/hr at 22 0637        Scheduled:   amLODIPine  10 mg Oral Daily    carvediloL  12.5 mg Oral BID    heparin (porcine)  5,000 Units Subcutaneous Q8H    levETIRAcetam  1,000 mg Oral BID        PRN:  naloxone, sodium chloride 0.9%    Assessment:   Niecy Bolivar is a 46 y.o. male with a PMHx of HTN [place] [facility] [place] w/near syncope for one day. Was taking double his hydralazine dose for one month.  JOÃO, transaminitis, hyperbilirubinemia and hypokalemia. In ED, fluid repletion and admitted to [facility] for further work up and management of JOÃO, hyperbilirubinemia, and elevated liver function. GI consulted with imaging and MRCP showing no findings, liver biopsy obtained, and Lamictal discontinued for possible liver toxicity. Neurologty consulted for AED management and switched to Keppra. Elevated ferritin and iron saturation, suggesting iron disorder. Pending liver biopsy and will continue fluids due to improvement of JOÃO and liver enzymes.     Plan:   JOÃO likely 2/2 Dehydration - Improving  Presyncope associated with dehydration from working in the heat for his occupation. Hypotensive on arrival w SBPs in 90s, possibly compounded by accidental double dosing of at home anti-hypertensive regimen.  - Admit Cr 2.1, baseline WNL per phone call with PCP since 03/2022  - Continue fluid repletion 125ml/hr  - Avoid nephrotoxic drugs     Near Syncopal Episode - Resolved  Light-headedness, dizziness, and near-syncope. SBPs in 90s on arrival. Condition improved after fluid resuscitation.   - Continue to monitor     Hyperbilirubinemia - Improving  Admit Tbili 6.9   - Abdominal US (07/03) showed nonspecific gallbladder wall thickening and enlarged liver   - MRI MRCP per GI recs (07/05) without evidence of biliary dilation / choledocholithiasis  - Iron studies (07/06) w ferritin elevated at 2008, iron elevated at 253, iron sat 84% concerning for hemochromatosis or other iron disorders   - ordered Hemochromatosis DNA Analysis which is pending at this time, DNA smear  - Liver US (07/06) demonstrated hepatomegaly and normal appearance of the gallbladder / biliary tree consistent with previous findings during this work up.   - Liver biopsy performed on (07/07) and pathology report pending     Elevated Liver Enzymes - Improving  AST, ALT, and ALKP = 333, 508, 190. Suspected to be due to possible shock liver brought on by  hypoperfusion related to his episode of dehydration, the patient presents w supplemental findings that necessitate ruling out alternative etiologies, including iron studies indicative of iron overload as mentioned above.   - Hepatitis panel negative  - Abdominal US, MRCP, Liver US results as mentioned above; hepatomegaly without biliary tract involvement.   - Liver biopsy performed (07/07) and pathology pending   - Ordered hemoglobin electrophoresis / blood smear to rule out secondary causes of iron overload, e.g. hemolytic processes / sideroblastic anemia   - Mild normocytic anemia on admit 13/37.1, MCV 82, RDW in 20s. Unimproved since admission.   -Per phone call with PCP: no anemia documented  -Improving with fluid repletion    Hypokalemia - Resolved  Presented with hypokalemia at 3.0. Intermittently low throughout hospital course.  - Replete PRN     Essential HTN  home medications: chlorthalidone 25 mg, amlodipine 10 mg, carvedilol 12.5 mg BID, hydralazine 100 mg TID. Initial presentation with SBPs in the 90s. Initially withheld antihypertensives given state of hypotension.  -continue home on amlodipine and carvedilol   -BPs high, consider restarting other medications    Seizure Disorder  Lamotrigine 50 mg for 24 years due to seizures. Seizure free since on anti-epileptics.   -Neurology was consulted given hepatic toxicity of Lamotrigine and his suspected hepatic injury.  - Stopped Lamotrigine and converted to Levetriacetam 1000 mg BID.   -Patient okay with continuing Levetiracetam OP    Diet: Diet Adult Regular (IDDSI Level 7)   DVT Prophylaxis: Hep  Code: Full Code   Dispo: Pending liver biopsy and lab work-up      Mihaela Mckinney MD  [facility] Internal Medicine, PGY-1      [facility] Medicine Hospitalist Pager numbers:   [facility] Hospitalist Medicine Team A (Sirena/Laura): [phone]  [facility] Hospitalist Medicine Team B (Rober/Preston):  [phone]

## 2022-07-09 NOTE — MEDICAL/APP STUDENT
[facility] Medicine Progress Note    Primary Team: [facility] Hospitalist Team A  Attending Physician: Greg Pack MD  Resident: Ashley Wallis MD  Intern: Mihaela Mckinney MD    Subjective:      The patient denies any new complaints or concerns. Been receiving IVF; 125 mL/hr since evaluation yesterday morning. Appetite intact. BM and urination intact. Denies CP, SOB, abdominal pain, n/v/d.     Objective:     Last 24 Hour Vital Signs:  BP  Min: 139/83  Max: 162/90  Temp  Av.3 °F (36.8 °C)  Min: 97.5 °F (36.4 °C)  Max: 98.7 °F (37.1 °C)  Pulse  Av.2  Min: 63  Max: 76  Resp  Av.3  Min: 16  Max: 18  SpO2  Av.3 %  Min: 97 %  Max: 99 %  I/O last 3 completed shifts:  In: 2392.5 [P.O.:120; I.V.:2272.5]  Out: 3350 [Urine:3350]    Physical Examination:  General: resting comfortably upon entrance to exam room. NAD. Well-appearing.   HENT: Normocephalic and atraumatic head. External exam normal. Moist mucous membranes.  Eyes: EOMI. PERRL. No scleral icterus.   Cardiovascular: RRR. No murmurs, rubs, gallops.  Pulmonary: TRES. Symmetric chest rise. Normal breath sounds.  Abdominal: Soft, non-distended, non-tender abdomen.   Skin: No bruises, rashes, lesions. Warm and dry skin.  Neurological: AAOx3. Baseline mental status. No obvious focal deficits.      Laboratory:  Laboratory Data Reviewed: yes  Pertinent Findings:              Other Results:  Radiology-guided Core Biopsy (Liver): In process      Current Medications:     Infusions:   0/9% NACL & POTASSIUM CHLORIDE 20 MEQ/L 125 mL/hr at 22 0637        Scheduled:   amLODIPine  10 mg Oral Daily    carvediloL  12.5 mg Oral BID    heparin (porcine)  5,000 Units Subcutaneous Q8H    levETIRAcetam  1,000 mg Oral BID        PRN:  naloxone, sodium chloride 0.9%    Assessment:     Niecy Bolivar is a 46 y.o.male with a history of seizure disorders, HTN who presented to the ED on  due to an episode of pre-syncope associated with dizziness, light-headedness  which occurred while working in the sun. Also known doubling of home hydralazine. Found to be hypotensive on exam w SBPs in 90s. Cr of 2.1 with unknown baseline but clinical picture consistent with pre-renal JOÃO. Also with elevated liver enzymes and elevated total bili found on CMP without associated complaints. The patient was resuscitated w IVF in ED w admission to Medicine. Improving trend with respect to renal function. Evaluation of his liver function and biliary tract yielded evidence of hepatomegaly w associated iron overload. Liver biopsy performed on 07/07 given concern for possible secondary hemochromatosis w pathology report pending at this time.       Plan:     Acute Kidney Injury, Pre-Renal - Dehydration  The patient presented to the hospital due to presyncope associated with dehydration from working in the heat for his occupation. Hypotensive on arrival w SBPs in 90s, possibly compounded by accidental double dosing of at home anti-hypertensive regimen. Improved after fluid resuscitation. Initial CMP suggestive of JOÃO with Cr at 2.1. [place]   - Trending Cr on daily CMPs   - Improving trend, Cr today at 1.4  - Continue fluid repletion via IVS; 500 mL IVF yesterday  - Avoid nephrotoxic drugs     Near Syncopal Episode, Resolved  Initial complaints on presentation included light-headedness, dizziness, and near-syncope. SBPs in 90s on arrival. Condition improved after fluid resuscitation.   - Continue to monitor     Hyperbilirubinemia  Found to have elevated total bili on CMP at 6.9 without related symptoms or complaints.  - Continue to trend w daily CMPs   - Mildly improving trend  - Abdominal US (07/03) showed nonspecific gallbladder wall thickening and enlarged liver   - MRI MRCP per GI recs (07/05) without evidence of biliary dilation / choledocholithiasis  - Iron studies (07/06) w ferritin elevated at 2008, iron elevated at 253, iron sat 84% concerning for  hemochromatosis    - ordered Hemochromatosis DNA Analysis which is pending at this time  - Liver US (07/06) demonstrated hepatomegaly and normal appearance of the gallbladder / biliary tree consistent with previous findings during this work up.   - Liver biopsy performed on (07/07) and pathology report pending     Elevated Liver Enzymes  AST, ALT, and ALKP were found to be elevated on initial CMP at 333, 508, 190. Although suspected to be due to possible shock liver brought on by hypoperfusion related to his episode of dehydration, the patient presents w supplemental findings that necessitate ruling out alternative etiologies, including iron studies indicative of iron overload as mentioned above.   - Hepatitis panel negative  - Abdominal US, MRCP, Liver US results as mentioned above; hepatomegaly without biliary tract involvement.   - Liver biopsy performed (07/07) and pathology pending   - Ordered hemoglobin electrophoresis / blood smear to rule out secondary causes of iron overload, e.g. hemolytic processes / sideroblastic anemia    - Mild normocytic anemia on admit 13/37.1, MCV 82, RDW in 20s. Unimproved since admission.     Hypokalemia  Presented with hypokalemia at 3.0. Intermittently low throughout hospital course.  - Will give potasium with IVF    Essential HTN  Initial presentation with SBPs in the 90s. Initially withheld antihypertensives given state of hypotension.  - At home medications: chlorthalidone 25 mg, amlodipine 10 mg, carvedilol 12.5 mg BID, hydralazine 100 mg TID   - Right now continued on amlodipine and carvedilol     Seizure Disorder  The patient had been taking Lamotrigine 50 mg for 24 years due to seizures. He has been seizure free since on anti-epileptics. Neurology was consulted given hepatic toxicity of Lamotrigine and his suspected hepatic injury.  - Stooped Lamotrigine and converted to Levetriacetam 1000 mg BID. Will keep him on Levetiracetam at this time.     Diet: Diet Adult Regular  (IDDSI Level 7)   DVT Prophylaxis: Hep  Code: Full Code   Dispo: Pending liver biopsy and lab work-up    Elie George  [facility] Internal Medicine MS-4    [facility] Medicine Hospitalist Pager numbers:   [facility] Hospitalist Medicine Team A (Sirena/Laura): [phone]  [facility] Hospitalist Medicine Team B (Rober/Preston):  [phone]

## 2022-07-10 LAB
ALBUMIN SERPL BCP-MCNC: 2.8 G/DL (ref 3.5–5.2)
ALP SERPL-CCNC: 162 U/L (ref 55–135)
ALT SERPL W/O P-5'-P-CCNC: 414 U/L (ref 10–44)
ANION GAP SERPL CALC-SCNC: 12 MMOL/L (ref 8–16)
AST SERPL-CCNC: 269 U/L (ref 10–40)
BASOPHILS # BLD AUTO: 0.08 K/UL (ref 0–0.2)
BASOPHILS NFR BLD: 1 % (ref 0–1.9)
BILIRUB SERPL-MCNC: 4.2 MG/DL (ref 0.1–1)
BUN SERPL-MCNC: 16 MG/DL (ref 6–20)
CALCIUM SERPL-MCNC: 9 MG/DL (ref 8.7–10.5)
CHLORIDE SERPL-SCNC: 106 MMOL/L (ref 95–110)
CO2 SERPL-SCNC: 21 MMOL/L (ref 23–29)
CREAT SERPL-MCNC: 1.4 MG/DL (ref 0.5–1.4)
DIFFERENTIAL METHOD: ABNORMAL
EOSINOPHIL # BLD AUTO: 0.2 K/UL (ref 0–0.5)
EOSINOPHIL NFR BLD: 2.5 % (ref 0–8)
ERYTHROCYTE [DISTWIDTH] IN BLOOD BY AUTOMATED COUNT: 22.1 % (ref 11.5–14.5)
EST. GFR  (AFRICAN AMERICAN): >60 ML/MIN/1.73 M^2
EST. GFR  (NON AFRICAN AMERICAN): >60 ML/MIN/1.73 M^2
GLUCOSE SERPL-MCNC: 86 MG/DL (ref 70–110)
HCT VFR BLD AUTO: 35.8 % (ref 40–54)
HGB BLD-MCNC: 12.6 G/DL (ref 14–18)
IMM GRANULOCYTES # BLD AUTO: 0.02 K/UL (ref 0–0.04)
IMM GRANULOCYTES NFR BLD AUTO: 0.3 % (ref 0–0.5)
LYMPHOCYTES # BLD AUTO: 3.8 K/UL (ref 1–4.8)
LYMPHOCYTES NFR BLD: 48 % (ref 18–48)
MAGNESIUM SERPL-MCNC: 2 MG/DL (ref 1.6–2.6)
MCH RBC QN AUTO: 29.3 PG (ref 27–31)
MCHC RBC AUTO-ENTMCNC: 35.2 G/DL (ref 32–36)
MCV RBC AUTO: 83 FL (ref 82–98)
MONOCYTES # BLD AUTO: 0.9 K/UL (ref 0.3–1)
MONOCYTES NFR BLD: 10.8 % (ref 4–15)
NEUTROPHILS # BLD AUTO: 3 K/UL (ref 1.8–7.7)
NEUTROPHILS NFR BLD: 37.4 % (ref 38–73)
NRBC BLD-RTO: 0 /100 WBC
PHOSPHATE SERPL-MCNC: 3 MG/DL (ref 2.7–4.5)
PLATELET # BLD AUTO: 221 K/UL (ref 150–450)
PMV BLD AUTO: 11.3 FL (ref 9.2–12.9)
POTASSIUM SERPL-SCNC: 3.2 MMOL/L (ref 3.5–5.1)
PROT SERPL-MCNC: 7.4 G/DL (ref 6–8.4)
RBC # BLD AUTO: 4.3 M/UL (ref 4.6–6.2)
SODIUM SERPL-SCNC: 139 MMOL/L (ref 136–145)
WBC # BLD AUTO: 7.88 K/UL (ref 3.9–12.7)

## 2022-07-10 PROCEDURE — 25000003 PHARM REV CODE 250

## 2022-07-10 PROCEDURE — 36415 COLL VENOUS BLD VENIPUNCTURE: CPT | Performed by: STUDENT IN AN ORGANIZED HEALTH CARE EDUCATION/TRAINING PROGRAM

## 2022-07-10 PROCEDURE — 83735 ASSAY OF MAGNESIUM: CPT | Performed by: STUDENT IN AN ORGANIZED HEALTH CARE EDUCATION/TRAINING PROGRAM

## 2022-07-10 PROCEDURE — 63600175 PHARM REV CODE 636 W HCPCS: Performed by: STUDENT IN AN ORGANIZED HEALTH CARE EDUCATION/TRAINING PROGRAM

## 2022-07-10 PROCEDURE — 11000001 HC ACUTE MED/SURG PRIVATE ROOM

## 2022-07-10 PROCEDURE — 25000003 PHARM REV CODE 250: Performed by: STUDENT IN AN ORGANIZED HEALTH CARE EDUCATION/TRAINING PROGRAM

## 2022-07-10 PROCEDURE — 85025 COMPLETE CBC W/AUTO DIFF WBC: CPT | Performed by: STUDENT IN AN ORGANIZED HEALTH CARE EDUCATION/TRAINING PROGRAM

## 2022-07-10 PROCEDURE — 84100 ASSAY OF PHOSPHORUS: CPT | Performed by: STUDENT IN AN ORGANIZED HEALTH CARE EDUCATION/TRAINING PROGRAM

## 2022-07-10 PROCEDURE — 80053 COMPREHEN METABOLIC PANEL: CPT | Performed by: STUDENT IN AN ORGANIZED HEALTH CARE EDUCATION/TRAINING PROGRAM

## 2022-07-10 RX ORDER — POTASSIUM CHLORIDE 20 MEQ/1
20 TABLET, EXTENDED RELEASE ORAL
Status: COMPLETED | OUTPATIENT
Start: 2022-07-10 | End: 2022-07-10

## 2022-07-10 RX ORDER — LOSARTAN POTASSIUM 25 MG/1
25 TABLET ORAL DAILY
Status: DISCONTINUED | OUTPATIENT
Start: 2022-07-10 | End: 2022-07-10

## 2022-07-10 RX ORDER — LOSARTAN POTASSIUM 50 MG/1
50 TABLET ORAL DAILY
Status: DISCONTINUED | OUTPATIENT
Start: 2022-07-11 | End: 2022-07-11 | Stop reason: HOSPADM

## 2022-07-10 RX ADMIN — POTASSIUM CHLORIDE 20 MEQ: 1500 TABLET, EXTENDED RELEASE ORAL at 11:07

## 2022-07-10 RX ADMIN — LEVETIRACETAM 1000 MG: 500 TABLET, FILM COATED ORAL at 09:07

## 2022-07-10 RX ADMIN — HEPARIN SODIUM 5000 UNITS: 5000 INJECTION INTRAVENOUS; SUBCUTANEOUS at 05:07

## 2022-07-10 RX ADMIN — POTASSIUM CHLORIDE 20 MEQ: 1500 TABLET, EXTENDED RELEASE ORAL at 09:07

## 2022-07-10 RX ADMIN — LEVETIRACETAM 1000 MG: 500 TABLET, FILM COATED ORAL at 08:07

## 2022-07-10 RX ADMIN — SODIUM CHLORIDE, SODIUM LACTATE, POTASSIUM CHLORIDE, AND CALCIUM CHLORIDE: .6; .31; .03; .02 INJECTION, SOLUTION INTRAVENOUS at 05:07

## 2022-07-10 RX ADMIN — HEPARIN SODIUM 5000 UNITS: 5000 INJECTION INTRAVENOUS; SUBCUTANEOUS at 09:07

## 2022-07-10 RX ADMIN — CARVEDILOL 12.5 MG: 12.5 TABLET, FILM COATED ORAL at 08:07

## 2022-07-10 RX ADMIN — LOSARTAN POTASSIUM 25 MG: 25 TABLET, FILM COATED ORAL at 11:07

## 2022-07-10 RX ADMIN — AMLODIPINE BESYLATE 10 MG: 5 TABLET ORAL at 08:07

## 2022-07-10 RX ADMIN — HEPARIN SODIUM 5000 UNITS: 5000 INJECTION INTRAVENOUS; SUBCUTANEOUS at 02:07

## 2022-07-10 RX ADMIN — POTASSIUM CHLORIDE 20 MEQ: 1500 TABLET, EXTENDED RELEASE ORAL at 10:07

## 2022-07-10 NOTE — PROGRESS NOTES
[facility] Medicine Progress Note    Primary Team: [facility] Hospitalist Team A  Attending Physician: Greg Pack MD  Resident:  / Mehdi  Intern: Faye Becker    Subjective:      The patient denies any new complaints or concerns. Notes increased urination from the IV fluids. Denies CP, SOB, abdominal pain, n/v/d.     Objective:     Last 24 Hour Vital Signs:  BP  Min: 132/74  Max: 157/94  Temp  Av.9 °F (36.6 °C)  Min: 97.6 °F (36.4 °C)  Max: 98.2 °F (36.8 °C)  Pulse  Av.8  Min: 58  Max: 64  Resp  Av.8  Min: 18  Max: 20  SpO2  Av %  Min: 95 %  Max: 99 %  I/O last 3 completed shifts:  In: 3074.7 [I.V.:3074.7]  Out: 3725 [Urine:3725]    Physical Examination:  General: resting comfortably upon entrance to exam room. NAD. Well-appearing.   HENT: Normocephalic and atraumatic head. External exam normal. Moist mucous membranes.  Eyes: EOMI. PERRL. No scleral icterus.   Cardiovascular: RRR. No murmurs, rubs, gallops.  Pulmonary: TRES. Symmetric chest rise. Normal breath sounds.  Abdominal: Soft, non-distended, non-tender abdomen.   Skin: No bruises, rashes, lesions. Warm and dry skin.  Neurological: AAOx3. Baseline mental status. No obvious focal deficits.      Laboratory:  Recent Labs   Lab 22  0522 22  0542 07/10/22  0559   WBC 7.75 8.33 7.88   HGB 11.9* 11.3* 12.6*   HCT 34.8* 32.6* 35.8*    198 221   MCV 84 84 83   RDW 21.8* 21.6* 22.1*    140 139   K 3.8 3.2* 3.2*    109 106   CO2 20* 20* 21*   BUN 24* 18 16   CREATININE 1.7* 1.4 1.4   GLU 84 88 86   PROT 6.6 6.9 7.4   ALBUMIN 2.8* 2.7* 2.8*   BILITOT 5.0* 4.1* 4.2*   * 272* 269*   ALKPHOS 170* 160* 162*   * 391* 414*     Other Results:  Radiology-guided Core Biopsy (Liver): In process    Current Medications:     Infusions:   lactated ringers 125 mL/hr at 07/10/22 0551        Scheduled:   amLODIPine  10 mg Oral Daily    carvediloL  12.5 mg Oral BID    heparin (porcine)  5,000 Units Subcutaneous  Q8H    levETIRAcetam  1,000 mg Oral BID     PRN:  naloxone, sodium chloride 0.9%    Assessment:   Niecy Bolivar is a 46 y.o. male with a PMHx of HTN who presents to [facility] for dizziness w/near syncope for one day. Was taking double his hydralazine dose for one month. JOÃO, transaminitis, hyperbilirubinemia and hypokalemia. In ED, fluid repletion and admitted to [facility] for further work up and management of JOÃO, hyperbilirubinemia, and elevated liver function. GI consulted with imaging and MRCP showing no findings, liver biopsy obtained, and Lamictal discontinued for possible liver toxicity. Neurologty consulted for AED management and switched to Keppra. Elevated ferritin and iron saturation, suggesting iron disorder. However, most likely patient had liver ischemia from being volume depleted and lamitctal further insulted the liver in its vulnerable state. Pending liver biopsy and will continue fluids that is improving liver enzymes. Repleted K+ today.     Plan:   JOÃO likely 2/2 Dehydration - Resolved  Presyncope associated with dehydration from working in the heat for his occupation. Hypotensive on arrival w SBPs in 90s, possibly compounded by accidental double dosing of at home anti-hypertensive regimen.  - Admit Cr 2.1, baseline WNL per phone call with PCP since 03/2022  - Continue fluid repletion 125ml/hr  - Avoid nephrotoxic drugs     Near Syncopal Episode - Resolved  Light-headedness, dizziness, and near-syncope. SBPs in 90s on arrival. Condition improved after fluid resuscitation.   - Continue to monitor     Hyperbilirubinemia - Improving  Admit Tbili 6.9   - Abdominal US (07/03) showed nonspecific gallbladder wall thickening and enlarged liver   - MRI MRCP per GI recs (07/05) without evidence of biliary dilation / choledocholithiasis  - Iron studies (07/06) w ferritin elevated at 2008, iron elevated at 253, iron sat 84% concerning for hemochromatosis or other iron disorders   - ordered Hemochromatosis  DNA Analysis which is pending at this time, DNA smear  - Liver US (07/06) demonstrated hepatomegaly and normal appearance of the gallbladder / biliary tree consistent with previous findings during this work up.   - Liver biopsy performed on (07/07) and pathology report pending     Elevated Liver Enzymes - Improved from Admit  AST, ALT, and ALKP = 333, 508, 190. Suspected to be due to possible shock liver brought on by hypoperfusion related to his episode of dehydration, the patient presents w supplemental findings that necessitate ruling out alternative etiologies, including iron studies indicative of iron overload as mentioned above.   - Hepatitis panel negative  - Abdominal US, MRCP, Liver US results as mentioned above; hepatomegaly without biliary tract involvement.   - Liver biopsy performed (07/07) and pathology pending   - Ordered hemoglobin electrophoresis / blood smear to rule out secondary causes of iron overload, e.g. hemolytic processes / sideroblastic anemia   - Mild normocytic anemia on admit 13/37.1, MCV 82, RDW in 20s. Unimproved since admission.   -Per phone call with PCP: no anemia documented  -Improved with fluid repletion but has plateaued: AST, ALT, Alk Phosh, Tbili    Hypokalemia - Resolved  Presented with hypokalemia at 3.0. Intermittently low throughout hospital course.  - Replete PRN     Essential HTN  home medications: chlorthalidone 25 mg, amlodipine 10 mg, carvedilol 12.5 mg BID, hydralazine 100 mg TID. Initial presentation with SBPs in the 90s. Initially withheld antihypertensives given state of hypotension.  -continue home on amlodipine and carvedilol   -BPs high, consider restarting other medications, IP goal     Seizure Disorder  Lamotrigine 50 mg for 24 years due to seizures. Seizure free since on anti-epileptics.   -Neurology was consulted given hepatic toxicity of Lamotrigine and his suspected hepatic injury.  - Stopped Lamotrigine and converted to Levetriacetam 1000 mg BID.    -Patient okay with continuing Levetiracetam/Kep OP      Diet: Diet Adult Regular (IDDSI Level 7)   DVT Prophylaxis: Heparin  Code: Full Code   Dispo: Pending liver biopsy       Mihaela Mckinney MD  [facility] Internal Medicine, PGY-1      [facility] Medicine Hospitalist Pager numbers:   [facility] Hospitalist Medicine Team A (Sirena/Laura): [phone]  [facility] Hospitalist Medicine Team B (Rober/Preston):  [phone]

## 2022-07-11 VITALS
HEIGHT: 72 IN | DIASTOLIC BLOOD PRESSURE: 82 MMHG | RESPIRATION RATE: 16 BRPM | TEMPERATURE: 98 F | BODY MASS INDEX: 38.25 KG/M2 | SYSTOLIC BLOOD PRESSURE: 137 MMHG | WEIGHT: 282.44 LBS | HEART RATE: 59 BPM | OXYGEN SATURATION: 98 %

## 2022-07-11 PROBLEM — N17.9 AKI (ACUTE KIDNEY INJURY): Status: RESOLVED | Noted: 2022-07-03 | Resolved: 2022-07-11

## 2022-07-11 PROBLEM — E80.6 HYPERBILIRUBINEMIA: Status: RESOLVED | Noted: 2022-07-03 | Resolved: 2022-07-11

## 2022-07-11 PROBLEM — R79.89 ELEVATED TROPONIN: Status: RESOLVED | Noted: 2022-07-03 | Resolved: 2022-07-11

## 2022-07-11 PROBLEM — E87.6 HYPOKALEMIA: Status: RESOLVED | Noted: 2022-07-03 | Resolved: 2022-07-11

## 2022-07-11 LAB
ALBUMIN SERPL BCP-MCNC: 2.9 G/DL (ref 3.5–5.2)
ALP SERPL-CCNC: 171 U/L (ref 55–135)
ALT SERPL W/O P-5'-P-CCNC: 381 U/L (ref 10–44)
ANCA AB TITR SER IF: NORMAL TITER
ANION GAP SERPL CALC-SCNC: 11 MMOL/L (ref 8–16)
AST SERPL-CCNC: 227 U/L (ref 10–40)
BASOPHILS # BLD AUTO: 0.07 K/UL (ref 0–0.2)
BASOPHILS NFR BLD: 0.8 % (ref 0–1.9)
BILIRUB SERPL-MCNC: 3.7 MG/DL (ref 0.1–1)
BUN SERPL-MCNC: 20 MG/DL (ref 6–20)
CALCIUM SERPL-MCNC: 9.1 MG/DL (ref 8.7–10.5)
CHLORIDE SERPL-SCNC: 107 MMOL/L (ref 95–110)
CO2 SERPL-SCNC: 22 MMOL/L (ref 23–29)
COMMENT: NORMAL
CREAT SERPL-MCNC: 1.5 MG/DL (ref 0.5–1.4)
DIFFERENTIAL METHOD: ABNORMAL
EOSINOPHIL # BLD AUTO: 0.2 K/UL (ref 0–0.5)
EOSINOPHIL NFR BLD: 2.6 % (ref 0–8)
ERYTHROCYTE [DISTWIDTH] IN BLOOD BY AUTOMATED COUNT: 21.2 % (ref 11.5–14.5)
EST. GFR  (AFRICAN AMERICAN): >60 ML/MIN/1.73 M^2
EST. GFR  (NON AFRICAN AMERICAN): 56 ML/MIN/1.73 M^2
FINAL PATHOLOGIC DIAGNOSIS: NORMAL
GLUCOSE SERPL-MCNC: 82 MG/DL (ref 70–110)
GROSS: NORMAL
HCT VFR BLD AUTO: 36.1 % (ref 40–54)
HGB BLD-MCNC: 12.3 G/DL (ref 14–18)
IMM GRANULOCYTES # BLD AUTO: 0.01 K/UL (ref 0–0.04)
IMM GRANULOCYTES NFR BLD AUTO: 0.1 % (ref 0–0.5)
LYMPHOCYTES # BLD AUTO: 4.1 K/UL (ref 1–4.8)
LYMPHOCYTES NFR BLD: 49 % (ref 18–48)
Lab: NORMAL
MAGNESIUM SERPL-MCNC: 1.9 MG/DL (ref 1.6–2.6)
MCH RBC QN AUTO: 29 PG (ref 27–31)
MCHC RBC AUTO-ENTMCNC: 34.1 G/DL (ref 32–36)
MCV RBC AUTO: 85 FL (ref 82–98)
MICROSCOPIC EXAM: NORMAL
MONOCYTES # BLD AUTO: 0.9 K/UL (ref 0.3–1)
MONOCYTES NFR BLD: 10.9 % (ref 4–15)
NEUTROPHILS # BLD AUTO: 3.1 K/UL (ref 1.8–7.7)
NEUTROPHILS NFR BLD: 36.6 % (ref 38–73)
NRBC BLD-RTO: 0 /100 WBC
P-ANCA TITR SER IF: NORMAL TITER
PHOSPHATE SERPL-MCNC: 4.2 MG/DL (ref 2.7–4.5)
PLATELET # BLD AUTO: 215 K/UL (ref 150–450)
PMV BLD AUTO: 10.6 FL (ref 9.2–12.9)
POTASSIUM SERPL-SCNC: 3.5 MMOL/L (ref 3.5–5.1)
PROT SERPL-MCNC: 7.1 G/DL (ref 6–8.4)
RBC # BLD AUTO: 4.24 M/UL (ref 4.6–6.2)
SODIUM SERPL-SCNC: 140 MMOL/L (ref 136–145)
WBC # BLD AUTO: 8.41 K/UL (ref 3.9–12.7)

## 2022-07-11 PROCEDURE — 25000003 PHARM REV CODE 250: Performed by: STUDENT IN AN ORGANIZED HEALTH CARE EDUCATION/TRAINING PROGRAM

## 2022-07-11 PROCEDURE — 83735 ASSAY OF MAGNESIUM: CPT | Performed by: STUDENT IN AN ORGANIZED HEALTH CARE EDUCATION/TRAINING PROGRAM

## 2022-07-11 PROCEDURE — 85025 COMPLETE CBC W/AUTO DIFF WBC: CPT | Performed by: STUDENT IN AN ORGANIZED HEALTH CARE EDUCATION/TRAINING PROGRAM

## 2022-07-11 PROCEDURE — 80053 COMPREHEN METABOLIC PANEL: CPT | Performed by: STUDENT IN AN ORGANIZED HEALTH CARE EDUCATION/TRAINING PROGRAM

## 2022-07-11 PROCEDURE — 36415 COLL VENOUS BLD VENIPUNCTURE: CPT | Performed by: STUDENT IN AN ORGANIZED HEALTH CARE EDUCATION/TRAINING PROGRAM

## 2022-07-11 PROCEDURE — 63600175 PHARM REV CODE 636 W HCPCS: Performed by: STUDENT IN AN ORGANIZED HEALTH CARE EDUCATION/TRAINING PROGRAM

## 2022-07-11 PROCEDURE — 84100 ASSAY OF PHOSPHORUS: CPT | Performed by: STUDENT IN AN ORGANIZED HEALTH CARE EDUCATION/TRAINING PROGRAM

## 2022-07-11 RX ORDER — LOSARTAN POTASSIUM 50 MG/1
50 TABLET ORAL DAILY
Qty: 90 TABLET | Refills: 0 | Status: SHIPPED | OUTPATIENT
Start: 2022-07-12 | End: 2023-07-12

## 2022-07-11 RX ORDER — LEVETIRACETAM 1000 MG/1
1000 TABLET ORAL 2 TIMES DAILY
Qty: 180 TABLET | Refills: 0 | Status: SHIPPED | OUTPATIENT
Start: 2022-07-11 | End: 2023-07-11

## 2022-07-11 RX ADMIN — HEPARIN SODIUM 5000 UNITS: 5000 INJECTION INTRAVENOUS; SUBCUTANEOUS at 02:07

## 2022-07-11 RX ADMIN — HEPARIN SODIUM 5000 UNITS: 5000 INJECTION INTRAVENOUS; SUBCUTANEOUS at 05:07

## 2022-07-11 RX ADMIN — AMLODIPINE BESYLATE 10 MG: 5 TABLET ORAL at 08:07

## 2022-07-11 RX ADMIN — LOSARTAN POTASSIUM 50 MG: 50 TABLET, FILM COATED ORAL at 08:07

## 2022-07-11 RX ADMIN — LEVETIRACETAM 1000 MG: 500 TABLET, FILM COATED ORAL at 08:07

## 2022-07-11 NOTE — PLAN OF CARE
Pt sleeping on and off through night. No pain, N/V. Medications administered per MAR. On RA. Urinal at bedside. Voids spontaneously. Safety maintained with bed alarm set, side rails raised, and call light in reach.

## 2022-07-11 NOTE — PLAN OF CARE
VN NOTE   Bedside nurse is reviewing discharge instructions with patient. Transport is here to get the patient.

## 2022-07-11 NOTE — PLAN OF CARE
Problem: Adult Inpatient Plan of Care  Goal: Plan of Care Review  7/11/2022 1447 by Michelle Slater RN  Outcome: Met  7/11/2022 1147 by Michelle Slatre RN  Outcome: Ongoing, Progressing  Goal: Patient-Specific Goal (Individualized)  Outcome: Met  Goal: Absence of Hospital-Acquired Illness or Injury  Outcome: Met  Goal: Optimal Comfort and Wellbeing  Outcome: Met  Goal: Readiness for Transition of Care  Outcome: Met     Problem: Fluid and Electrolyte Imbalance (Acute Kidney Injury/Impairment)  Goal: Fluid and Electrolyte Balance  Outcome: Met     Problem: Oral Intake Inadequate (Acute Kidney Injury/Impairment)  Goal: Optimal Nutrition Intake  Outcome: Met     Problem: Renal Function Impairment (Acute Kidney Injury/Impairment)  Goal: Effective Renal Function  Outcome: Met     Problem: Fall Injury Risk  Goal: Absence of Fall and Fall-Related Injury  Outcome: Met     Problem: Hypertension Comorbidity  Goal: Blood Pressure in Desired Range  Outcome: Met     Problem: Seizure Disorder Comorbidity  Goal: Maintenance of Seizure Control  Outcome: Met     Problem: Syncope  Goal: Absence of Syncopal Symptoms  Outcome: Met     Problem: Hyperbilirubinemia  Goal: Bilirubin Level Within Desired Range  Outcome: Met

## 2022-07-11 NOTE — PROGRESS NOTES
Niecy Bolivar #[id number] (CSN: [id number]) (46 y.o. M) (Adm: 07/03/22)  [facility] [id number]    PCP    NO, PRIMARY DOCTOR    Date of Birth    1975       Demographics    Address:   [address] [place] [address]    Home Phone:   [phone]    Work Phone:       Mobile Phone:                 SSN:       Insurance:   BLUE CROSS BLUE [facility]    Marital Status:   Unknown    Catholic:   No Catholic                  Admission Dx    R55 Syncope   R17 Serum total bilirubin elevated   R79.89 Elevated LFTs   R07.9 Chest pain   R55 Near syncope   N17.9 Acute kidney injury       Chief Complaint    Complaint Comment   near syncopal episode Patient was at work and admits that he may have taken too much of his BP medication. Patient became dizzy and near fainted. BP was 90's palpable and improved after fluid bolus. EMS started iv access and gave fluid bolus of 150. Denies pain at present time. [place]       Documents Filed to Patient    Power of  Living Will Clinical Unknown Study Attachment Consent Form ABN Waiver After Visit Summary Lab Result Scan Code Status Patient Portal Status    Not on File  Not on File  Not on File  Not on File  Not on File  Not on File  Filed  Not on File  FULL [Updated on 07/03/22 1943]  Pending       Admission Information      Current Information    Attending at Discharge Admitting Provider Admission Type Admission Status   MD Greg Baker MD Emergency Confirmed Discharge          Admission Date/Time Discharge Date Hospital Service Auth/Cert Status   07/03/22  03:08 PM 07/11/22 Hospital Medicine Incomplete          Hospital Area Unit Room/Bed    [facility] MEDICAL SURGICAL UNIT ACUTE K516/K516 A            Discharge Disposition Discharge Destination   Home or Self Care        Hospital Account    Name Acct ID Class Status Primary Coverage   Niecy Bolivar [id number] IP- Inpatient Discharged/Not Billed [facility]  SHIELD - BCBS ALL OUT OF STATE            Guarantor Account (for Hospital Account #[id number])    Name Relation to Pt Service Area Active? Acct Type   Niecy Bolivar Self OHSSA Yes Personal/Family   Address Phone     [address] [place] [address] [phone])              Coverage Information (for Hospital Account #[id number])    F/O Payor/Plan Precert #   BLUE CROSS BLUE SHIELD/BCBS ALL OUT OF STATE Case# [id number]   Subscriber Subscriber #   Niecy Bolivar [id number]   Address Phone   [address] [address]   [place] [address] [phone]            Emergency Contact Information    Name:  Relationship:    Address:     City:  State:  Zip:  Phone:     Business phone:

## 2022-07-11 NOTE — DISCHARGE INSTRUCTIONS
Changes to your blood pressure medications;     - STOP carvedilol, chlorthalidone, hydralazine     - Begin: Losartan 50mg daily, Amlodipine 10mg daily  Changes to seizure medications:     - STOP lamotrigine     - Begin levetiracetam 1000mg twice a day (Keppra)     Followup with your primary care doctor once you have returned to [place], get repeat labs of bilirubin and liver enzymes at that time    We will call you with the pathology results from your liver biopsy, and also fax to Dr. Nichols.     7/15 9am appointment with IM doctor, Dr. Umanzor here for followup labs and check in.

## 2022-07-11 NOTE — PLAN OF CARE
pt for d/c to home today   transportation per [facility] arranged for d/c to the [place] [facility].      d/c summary faxed to pt's pcp Dr. Macy Sung   [phone]      Future Appointments   Date Time Provider Department Center   7/15/2022  9:00 AM Kerry Umanzor MD [facility]        07/11/22 1546   Final Note   Assessment Type Final Discharge Note   Anticipated Discharge Disposition Home   What phone number can be called within the next 1-3 days to see how you are doing after discharge? [phone]   Hospital Resources/Appts/Education Provided Post-Acute resouces added to AVS   Post-Acute Status   Other Status No Post-Acute Service Needs   Discharge Delays None known at this time

## 2022-07-11 NOTE — PROGRESS NOTES
[facility] Medicine Progress Note    Primary Team: [facility] Hospitalist Team A  Attending Physician: Greg Pack MD  Resident:  / Mehdi  Intern: Faye / Eugenio    Subjective:      Mr. Bolivar reports feeling fine this morning, no nausea, vomiting, abdominal pain. Normal po intake. No dizziness or Light headedness. Preparing for discharge     Objective:     Last 24 Hour Vital Signs:  BP  Min: 128/80  Max: 177/103  Temp  Av.5 °F (36.4 °C)  Min: 94.5 °F (34.7 °C)  Max: 98.2 °F (36.8 °C)  Pulse  Av.6  Min: 59  Max: 65  Resp  Av.8  Min: 18  Max: 20  SpO2  Av.2 %  Min: 95 %  Max: 99 %  Weight  Av.1 kg (282 lb 6.6 oz)  Min: 128.1 kg (282 lb 6.6 oz)  Max: 128.1 kg (282 lb 6.6 oz)  I/O last 3 completed shifts:  In: 1920.8 [I.V.:0.8]  Out: 2350 [Urine:2350]    Physical Examination:  General: resting comfortably, sitting up in bed.   HENT: Normocephalic and atraumatic head. External exam normal. Moist mucous membranes.  Eyes: EOMI. PERRL. No scleral icterus.   Cardiovascular: RRR. No murmurs, rubs, gallops.  Pulmonary: TRES. Symmetric chest rise. Normal breath sounds.  Abdominal: Soft, non-distended, non-tender abdomen.   Skin: No bruises, rashes, lesions. Warm and dry skin.  Neurological: AAOx3. Baseline mental status. No obvious focal deficits.      Laboratory:  Recent Labs   Lab 22  0542 07/10/22  0559 22  0501   WBC 8.33 7.88 8.41   HGB 11.3* 12.6* 12.3*   HCT 32.6* 35.8* 36.1*    221 215   MCV 84 83 85   RDW 21.6* 22.1* 21.2*    139 140   K 3.2* 3.2* 3.5    106 107   CO2 20* 21* 22*   BUN 18 16 20   CREATININE 1.4 1.4 1.5*   GLU 88 86 82   PROT 6.9 7.4 7.1   ALBUMIN 2.7* 2.8* 2.9*   BILITOT 4.1* 4.2* 3.7*   * 269* 227*   ALKPHOS 160* 162* 171*   * 414* 381*     Other Results:  Radiology-guided Core Biopsy (Liver): In process    Current Medications:     Infusions:       Scheduled:   amLODIPine  10 mg Oral Daily    heparin (porcine)  5,000 Units  Subcutaneous Q8H    levETIRAcetam  1,000 mg Oral BID    losartan  50 mg Oral Daily     PRN:  naloxone, sodium chloride 0.9%    Assessment:   Niecy Bolivar is a 46 y.o. male with a PMHx of HTN who presents to [facility] for dizziness w/near syncope for one day. Was taking double his hydralazine dose for one month. JOÃO, transaminitis, hyperbilirubinemia and hypokalemia. In ED, fluid repletion and admitted to [facility] for further work up and management of JOÃO, hyperbilirubinemia, and elevated liver function. GI consulted with imaging and MRCP showing no findings, liver biopsy obtained, and Lamictal discontinued for possible liver toxicity. Neurologty consulted for AED management and switched to Keppra. Elevated ferritin and iron saturation, suggesting iron disorder. However, most likely patient had liver ischemia from being volume depleted and lamitctal further insulted the liver in its vulnerable state. Pending liver biopsy and will continue fluids that is improving liver enzymes.     Plan:   JOÃO likely 2/2 Dehydration - Resolved  Presyncope associated with dehydration from working in the heat for his occupation. Hypotensive on arrival w SBPs in 90s, possibly compounded by accidental double dosing of at home anti-hypertensive regimen.  - Admit Cr 2.1, baseline WNL per phone call with PCP since 03/2022  - Continue fluid repletion 125ml/hr  - Avoid nephrotoxic drugs     Near Syncopal Episode - Resolved  Light-headedness, dizziness, and near-syncope. SBPs in 90s on arrival. Condition improved after fluid resuscitation.   - Continue to monitor     Hyperbilirubinemia - Improving  Admit Tbili 6.9   - Abdominal US (07/03) showed nonspecific gallbladder wall thickening and enlarged liver   - MRI MRCP per GI recs (07/05) without evidence of biliary dilation / choledocholithiasis  - Iron studies (07/06) w ferritin elevated at 2008, iron elevated at 253, iron sat 84% concerning for hemochromatosis or other iron disorders   -  ordered Hemochromatosis DNA Analysis which is pending at this time, DNA smear  - Liver US (07/06) demonstrated hepatomegaly and normal appearance of the gallbladder / biliary tree consistent with previous findings during this work up.   - Liver biopsy performed on (07/07) and pathology report pending     Elevated Liver Enzymes - Improving  AST, ALT, and ALKP = 333, 508, 190. Suspected shock liver brought on by hypoperfusion related to his episode of dehydration, the patient presents w supplemental findings that necessitate ruling out alternative etiologies, including iron studies indicative of iron overload as mentioned above.   - Hepatitis panel negative  - Abdominal US, MRCP, Liver US results as mentioned above; hepatomegaly without biliary tract involvement.   - Liver biopsy performed (07/07) and pathology pending will followup with results  - Ordered hemoglobin electrophoresis / blood smear to rule out secondary causes of iron overload, e.g. hemolytic processes / sideroblastic anemia   - Mild normocytic anemia on admit 13/37.1, MCV 82, RDW in 20s. Unimproved since admission.   -Per phone call with PCP: no anemia documented  -Improved with fluid repletion but has plateaued: AST, ALT, Alk Phosh, Tbili    Essential HTN  home medications: chlorthalidone 25 mg, amlodipine 10 mg, carvedilol 12.5 mg BID, hydralazine 100 mg TID. Initial presentation with SBPs in the 90s. Initially withheld antihypertensives given state of hypotension.  -continue PTA amlodipine and carvedilol     Seizure Disorder  PTA Lamotrigine 50 mg for 24 years due to seizures. Seizure free since on anti-epileptics.   -Neurology was consulted given hepatic toxicity of Lamotrigine and his suspected hepatic injury.  - Stopped Lamotrigine and converted to Levetriacetam 1000 mg BID.     Hypokalemia - Resolved  Presented with hypokalemia at 3.0. Intermittently low throughout hospital course.  - Replete PRN     Diet: Diet Adult Regular (IDDSI Level 7)    DVT Prophylaxis: Heparin  Code: Full Code   Dispo: Pending liver biopsy       Ines Cam MD  [facility] Internal Medicine/Pediatrics PGY3/HO2      [facility] Medicine Hospitalist Pager numbers:   [facility] Hospitalist Medicine Team A (Sirena/Laura): [phone]  [facility] Hospitalist Medicine Team B (Rober/Preston):  [phone]

## 2022-07-11 NOTE — DISCHARGE SUMMARY
[facility] Internal Medicine Team A Discharge Summary    Primary Team: [facility] A  Attending Physician: Greg Pack MD  Resident: Daljit    Date of Admit: 7/3/2022  Date of Discharge: 7/11/2022    Discharge to: Home quarantine  Condition: Stable    Discharge Diagnoses     Patient Active Problem List   Diagnosis    Elevated liver enzymes    Seizure disorder    Hypertension    Elevated LFTs     Consultants and Procedures     Consultants:  Gastroenterology    Procedures:   MRCP  Hepatic biopsy (IR 7/7)    Brief History of Present Illness      Niecy Bolivar is a 46 y.o. male with a PMHx of HTN who [facility] for dizziness w/near syncope for one day. Found to have JOÃO, transaminitis, hyperbilirubinemia and hypokalemia.     For the full HPI please refer to the History & Physical from this admission.    Hospital Course By Problem with Pertinent Findings   JOÃO likely 2/2 Dehydration - Resolved  Presyncope associated with dehydration from working in the heat for his occupation. Hypotensive on arrival w SBPs in 90s, possibly compounded by accidental double dosing of at home anti-hypertensive regimen. Underwent fluid repletion 125cc/hr.     Near Syncopal Episode - Resolved  Light-headedness, dizziness, and near-syncope. SBPs in 90s on arrival. Condition improved after fluid resuscitation.      Hyperbilirubinemia - Improving  Admit Tbili 6.9   - Abdominal US (07/03) showed nonspecific gallbladder wall thickening and enlarged liver   - MRI MRCP per GI recs (07/05) without evidence of biliary dilation / choledocholithiasis  - Iron studies (07/06) w ferritin elevated at 2008, iron elevated at 253, iron sat 84% concerning for hemochromatosis or other iron disorders              - ordered Hemochromatosis DNA Analysis which is pending at this time, DNA smear  - Liver US (07/06) demonstrated hepatomegaly and normal appearance of the gallbladder / biliary tree consistent with previous findings during this work up.   -  Liver biopsy performed on (07/07) and pathology report pending      Elevated Liver Enzymes - Improving  Initial AST, ALT, and ALKP = 333, 508, 190. Suspected shock liver brought on by hypoperfusion related to his episode of dehydration, the patient presents w supplemental findings that necessitate ruling out alternative etiologies, including iron studies indicative of iron overload as mentioned above. At discharge AST/ALT/AlkP: 227/381/171  - Hepatitis panel negative  - Abdominal US, MRCP, Liver US results as mentioned above; hepatomegaly without biliary tract involvement.   - Liver biopsy performed (07/07) and pathology pending will followup with results  - Ordered hemoglobin electrophoresis / blood smear to rule out secondary causes of iron overload (Ferritin 2008), e.g. hemolytic processes / sideroblastic anemia. Results pending  - Mild normocytic anemia on admit 13/37.1, MCV 82, RDW in 20s, unimproved since admission     Essential HTN  home medications: chlorthalidone 25 mg, amlodipine 10 mg, carvedilol 12.5 mg BID, hydralazine 100 mg TID. Initial presentation with SBPs in the 90s. Initially withheld antihypertensives given state of hypotension. Change in regimen during hospitalization.  - Continue PTA amlodipine 10mg daily  - Begin Losartan 50mg daily  - Discontinue carvedilol, chlorthalidone, and hydralazine  - followup with PCP     Seizure Disorder  PTA Lamotrigine 50 mg for 24 years due to seizures. Seizure free since on anti-epileptics.   -Neurology was consulted given hepatic toxicity of Lamotrigine and his suspected hepatic injury.  - Stopped Lamotrigine and began on Levetriacetam 1000 mg BID.      Hypokalemia - Resolved  Presented with hypokalemia at 3.0. Intermittently low throughout hospital course.     Discharge Medications        Medication List      START taking these medications    levETIRAcetam 1000 MG tablet  Commonly known as: KEPPRA  Take 1 tablet (1,000 mg total) by mouth 2 (two) times  daily.     losartan 50 MG tablet  Commonly known as: COZAAR  Take 1 tablet (50 mg total) by mouth once daily.  Start taking on: July 12, 2022        CONTINUE taking these medications    amLODIPine 10 MG tablet  Commonly known as: NORVASC     atorvastatin 40 MG tablet  Commonly known as: LIPITOR        STOP taking these medications    carvediloL 12.5 MG tablet  Commonly known as: COREG     chlorthalidone 25 MG Tab  Commonly known as: HYGROTEN     hydrALAZINE 100 MG tablet  Commonly known as: APRESOLINE     lamoTRIgine 25 MG tablet  Commonly known as: LAMICTAL     sildenafil 20 mg Tab  Commonly known as: REVATIO           Where to Get Your Medications      These medications were sent to [facility]  [address], [place] [address]    Hours: Mon-Fri, 8a-5:30p Phone: [phone]   · levETIRAcetam 1000 MG tablet  · losartan 50 MG tablet         Discharge Information:   Diet:  Regular diet, stay well hydrated     Physical Activity:  As tolerated    Instructions:  1. Take all medications as prescribed  2. Keep all follow-up appointments  3. Return to the hospital or call your primary care physicians if any worsening symptoms such as nausea., vomiting, light headedness, increased fatigue occur.    Follow-Up Appointments:  - PCP within one month  - IM doctor within 1 week for labs and any concerns  - may need followup with GI pending labs and hepatic biopsy    Follow up items for PCP and tests that have not resulted at time of discharge:   1. Hepatic biopsy  2. Repeat CMP and Bilirubin      Ines Cam  [facility] Internal Medicine/Pediatrics, HO-2

## 2022-07-11 NOTE — NURSING
Pt discharged per orders. IV removed without complication and catheter tip intact. Patient belongings accounted for. AVS reviewed with patient and patient verbalized understanding. Medications delivered to patients' bedside.

## 2022-07-12 LAB — MITOCHONDRIA AB TITR SER IF: NORMAL {TITER}

## 2022-07-14 LAB
GENETICIST REVIEW: NORMAL
HFE GENE MUT ANL BLD/T: NORMAL
HFE RELEASED BY: NORMAL
HFE RESULT SUMMARY: NEGATIVE
HGB A2 MFR BLD HPLC: 2.5 % (ref 2.2–3.2)
HGB FRACT BLD ELPH-IMP: NORMAL
HGB FRACT BLD ELPH-IMP: NORMAL
REF LAB TEST METHOD: NORMAL
SPECIMEN SOURCE: NORMAL
SPECIMEN,  HEMOCHROMATOSIS: NORMAL

## 2023-09-19 NOTE — PLAN OF CARE
Contacted Pt PCP and was given most recent labs verbally    From 03/2022:    AST- 25,  ALT- 46, TBilli 0.3, CBC WNL, PSA WNL                Gigi Becker MD  [facility] Neurology PGY-1     Quality 431: Preventive Care And Screening: Unhealthy Alcohol Use - Screening: Patient not identified as an unhealthy alcohol user when screened for unhealthy alcohol use using a systematic screening method Quality 130: Documentation Of Current Medications In The Medical Record: Current Medications Documented Quality 226: Preventive Care And Screening: Tobacco Use: Screening And Cessation Intervention: Patient screened for tobacco use and is an ex/non-smoker Detail Level: Detailed